# Patient Record
Sex: MALE | Race: WHITE | NOT HISPANIC OR LATINO | Employment: FULL TIME | URBAN - NONMETROPOLITAN AREA
[De-identification: names, ages, dates, MRNs, and addresses within clinical notes are randomized per-mention and may not be internally consistent; named-entity substitution may affect disease eponyms.]

---

## 2018-08-18 ENCOUNTER — APPOINTMENT (OUTPATIENT)
Dept: CT IMAGING | Facility: HOSPITAL | Age: 33
End: 2018-08-18

## 2018-08-18 ENCOUNTER — HOSPITAL ENCOUNTER (INPATIENT)
Facility: HOSPITAL | Age: 33
LOS: 3 days | Discharge: HOME | End: 2018-08-21
Attending: EMERGENCY MEDICINE | Admitting: HOSPITALIST

## 2018-08-18 DIAGNOSIS — B15.9 VIRAL HEPATITIS A WITHOUT HEPATIC COMA: Primary | ICD-10-CM

## 2018-08-18 PROBLEM — Z72.0 TOBACCO ABUSE: Status: ACTIVE | Noted: 2018-08-18

## 2018-08-18 LAB
6-ACETYL MORPHINE: NEGATIVE
ALBUMIN SERPL-MCNC: 3.9 G/DL (ref 3.5–5)
ALBUMIN/GLOB SERPL: 1.2 G/DL (ref 1.5–2.5)
ALP SERPL-CCNC: 268 U/L (ref 40–129)
ALT SERPL W P-5'-P-CCNC: 4210 U/L (ref 10–44)
AMMONIA BLD-SCNC: 40 UMOL/L (ref 16–60)
AMPHET+METHAMPHET UR QL: POSITIVE
ANION GAP SERPL CALCULATED.3IONS-SCNC: 5.3 MMOL/L (ref 3.6–11.2)
APTT PPP: 38 SECONDS (ref 23.8–36.1)
AST SERPL-CCNC: 3590 U/L (ref 10–34)
BACTERIA UR QL AUTO: ABNORMAL /HPF
BARBITURATES UR QL SCN: NEGATIVE
BENZODIAZ UR QL SCN: NEGATIVE
BILIRUB SERPL-MCNC: 5.4 MG/DL (ref 0.2–1.8)
BILIRUB UR QL STRIP: ABNORMAL
BUN BLD-MCNC: 12 MG/DL (ref 7–21)
BUN/CREAT SERPL: 18.5 (ref 7–25)
BUPRENORPHINE SERPL-MCNC: POSITIVE NG/ML
C TRACH RRNA CVX QL NAA+PROBE: NOT DETECTED
CALCIUM SPEC-SCNC: 9.2 MG/DL (ref 7.7–10)
CANNABINOIDS SERPL QL: POSITIVE
CHLORIDE SERPL-SCNC: 102 MMOL/L (ref 99–112)
CLARITY UR: CLEAR
CO2 SERPL-SCNC: 27.7 MMOL/L (ref 24.3–31.9)
COCAINE UR QL: NEGATIVE
COLOR UR: ABNORMAL
CREAT BLD-MCNC: 0.65 MG/DL (ref 0.43–1.29)
DEPRECATED RDW RBC AUTO: 41.1 FL (ref 37–54)
EOSINOPHIL # BLD MANUAL: 0.07 10*3/MM3 (ref 0–0.7)
EOSINOPHIL NFR BLD MANUAL: 2 % (ref 0–5)
ERYTHROCYTE [DISTWIDTH] IN BLOOD BY AUTOMATED COUNT: 13.5 % (ref 11.5–14.5)
GFR SERPL CREATININE-BSD FRML MDRD: 144 ML/MIN/1.73
GLOBULIN UR ELPH-MCNC: 3.2 GM/DL
GLUCOSE BLD-MCNC: 89 MG/DL (ref 70–110)
GLUCOSE UR STRIP-MCNC: NEGATIVE MG/DL
HAV IGM SERPL QL IA: REACTIVE
HBA1C MFR BLD: 5.3 % (ref 4.5–5.7)
HBV CORE IGM SERPL QL IA: ABNORMAL
HBV SURFACE AG SERPL QL IA: ABNORMAL
HCT VFR BLD AUTO: 43.6 % (ref 42–52)
HCV AB SER DONR QL: ABNORMAL
HGB BLD-MCNC: 15.3 G/DL (ref 14–18)
HGB UR QL STRIP.AUTO: NEGATIVE
HIV1+2 AB SER QL: NORMAL
HYALINE CASTS UR QL AUTO: ABNORMAL /LPF
INR PPP: 1.45 (ref 0.9–1.1)
KETONES UR QL STRIP: ABNORMAL
LEUKOCYTE ESTERASE UR QL STRIP.AUTO: ABNORMAL
LYMPHOCYTES # BLD MANUAL: 1.53 10*3/MM3 (ref 1–3)
LYMPHOCYTES NFR BLD MANUAL: 19 % (ref 0–10)
LYMPHOCYTES NFR BLD MANUAL: 45 % (ref 21–51)
MAGNESIUM SERPL-MCNC: 1.8 MG/DL (ref 1.7–2.6)
MCH RBC QN AUTO: 29.4 PG (ref 27–33)
MCHC RBC AUTO-ENTMCNC: 35.1 G/DL (ref 33–37)
MCV RBC AUTO: 83.8 FL (ref 80–94)
METHADONE UR QL SCN: NEGATIVE
MONOCYTES # BLD AUTO: 0.65 10*3/MM3 (ref 0.1–0.9)
N GONORRHOEA RRNA SPEC QL NAA+PROBE: NOT DETECTED
NEUTROPHILS # BLD AUTO: 1.16 10*3/MM3 (ref 1.4–6.5)
NEUTROPHILS NFR BLD MANUAL: 31 % (ref 30–70)
NEUTS BAND NFR BLD MANUAL: 3 % (ref 4–12)
NITRITE UR QL STRIP: POSITIVE
OPIATES UR QL: NEGATIVE
OSMOLALITY SERPL CALC.SUM OF ELEC: 269.3 MOSM/KG (ref 273–305)
OXYCODONE UR QL SCN: NEGATIVE
PCP UR QL SCN: NEGATIVE
PH UR STRIP.AUTO: 5.5 [PH] (ref 5–8)
PHOSPHATE SERPL-MCNC: 3.3 MG/DL (ref 2.7–4.5)
PLAT MORPH BLD: NORMAL
PLATELET # BLD AUTO: 177 10*3/MM3 (ref 130–400)
PMV BLD AUTO: 12.2 FL (ref 6–10)
POTASSIUM BLD-SCNC: 4.1 MMOL/L (ref 3.5–5.3)
PROT SERPL-MCNC: 7.1 G/DL (ref 6–8)
PROT UR QL STRIP: ABNORMAL
PROTHROMBIN TIME: 17.9 SECONDS (ref 11–15.4)
RBC # BLD AUTO: 5.2 10*6/MM3 (ref 4.7–6.1)
RBC # UR: ABNORMAL /HPF
RBC MORPH BLD: NORMAL
REF LAB TEST METHOD: ABNORMAL
SCAN SLIDE: NORMAL
SODIUM BLD-SCNC: 135 MMOL/L (ref 135–153)
SP GR UR STRIP: 1.02 (ref 1–1.03)
SQUAMOUS #/AREA URNS HPF: ABNORMAL /HPF
TSH SERPL DL<=0.05 MIU/L-ACNC: 0.5 MIU/ML (ref 0.55–4.78)
UROBILINOGEN UR QL STRIP: ABNORMAL
WBC NRBC COR # BLD: 3.4 10*3/MM3 (ref 4.5–12.5)
WBC UR QL AUTO: ABNORMAL /HPF

## 2018-08-18 PROCEDURE — 84439 ASSAY OF FREE THYROXINE: CPT | Performed by: PHYSICIAN ASSISTANT

## 2018-08-18 PROCEDURE — G0378 HOSPITAL OBSERVATION PER HR: HCPCS

## 2018-08-18 PROCEDURE — 80307 DRUG TEST PRSMV CHEM ANLYZR: CPT | Performed by: PHYSICIAN ASSISTANT

## 2018-08-18 PROCEDURE — G0432 EIA HIV-1/HIV-2 SCREEN: HCPCS | Performed by: PHYSICIAN ASSISTANT

## 2018-08-18 PROCEDURE — 93005 ELECTROCARDIOGRAM TRACING: CPT | Performed by: PHYSICIAN ASSISTANT

## 2018-08-18 PROCEDURE — 85610 PROTHROMBIN TIME: CPT | Performed by: PHYSICIAN ASSISTANT

## 2018-08-18 PROCEDURE — 99406 BEHAV CHNG SMOKING 3-10 MIN: CPT | Performed by: PHYSICIAN ASSISTANT

## 2018-08-18 PROCEDURE — 84100 ASSAY OF PHOSPHORUS: CPT | Performed by: PHYSICIAN ASSISTANT

## 2018-08-18 PROCEDURE — 85007 BL SMEAR W/DIFF WBC COUNT: CPT | Performed by: PHYSICIAN ASSISTANT

## 2018-08-18 PROCEDURE — 83036 HEMOGLOBIN GLYCOSYLATED A1C: CPT | Performed by: PHYSICIAN ASSISTANT

## 2018-08-18 PROCEDURE — 81001 URINALYSIS AUTO W/SCOPE: CPT | Performed by: PHYSICIAN ASSISTANT

## 2018-08-18 PROCEDURE — 83735 ASSAY OF MAGNESIUM: CPT | Performed by: PHYSICIAN ASSISTANT

## 2018-08-18 PROCEDURE — 99285 EMERGENCY DEPT VISIT HI MDM: CPT

## 2018-08-18 PROCEDURE — 82140 ASSAY OF AMMONIA: CPT | Performed by: PHYSICIAN ASSISTANT

## 2018-08-18 PROCEDURE — 25010000002 KETOROLAC TROMETHAMINE PER 15 MG: Performed by: PHYSICIAN ASSISTANT

## 2018-08-18 PROCEDURE — 74176 CT ABD & PELVIS W/O CONTRAST: CPT | Performed by: RADIOLOGY

## 2018-08-18 PROCEDURE — 85730 THROMBOPLASTIN TIME PARTIAL: CPT | Performed by: PHYSICIAN ASSISTANT

## 2018-08-18 PROCEDURE — 80053 COMPREHEN METABOLIC PANEL: CPT | Performed by: PHYSICIAN ASSISTANT

## 2018-08-18 PROCEDURE — 87591 N.GONORRHOEAE DNA AMP PROB: CPT | Performed by: PHYSICIAN ASSISTANT

## 2018-08-18 PROCEDURE — 84443 ASSAY THYROID STIM HORMONE: CPT | Performed by: PHYSICIAN ASSISTANT

## 2018-08-18 PROCEDURE — 25010000002 ONDANSETRON PER 1 MG: Performed by: PHYSICIAN ASSISTANT

## 2018-08-18 PROCEDURE — 99223 1ST HOSP IP/OBS HIGH 75: CPT | Performed by: PHYSICIAN ASSISTANT

## 2018-08-18 PROCEDURE — 74176 CT ABD & PELVIS W/O CONTRAST: CPT

## 2018-08-18 PROCEDURE — 94799 UNLISTED PULMONARY SVC/PX: CPT

## 2018-08-18 PROCEDURE — 87491 CHLMYD TRACH DNA AMP PROBE: CPT | Performed by: PHYSICIAN ASSISTANT

## 2018-08-18 PROCEDURE — 80074 ACUTE HEPATITIS PANEL: CPT | Performed by: PHYSICIAN ASSISTANT

## 2018-08-18 PROCEDURE — 85025 COMPLETE CBC W/AUTO DIFF WBC: CPT | Performed by: PHYSICIAN ASSISTANT

## 2018-08-18 RX ORDER — ALUMINA, MAGNESIA, AND SIMETHICONE 2400; 2400; 240 MG/30ML; MG/30ML; MG/30ML
SUSPENSION ORAL
Status: COMPLETED
Start: 2018-08-18 | End: 2018-08-18

## 2018-08-18 RX ORDER — KETOROLAC TROMETHAMINE 30 MG/ML
30 INJECTION, SOLUTION INTRAMUSCULAR; INTRAVENOUS ONCE
Status: COMPLETED | OUTPATIENT
Start: 2018-08-18 | End: 2018-08-18

## 2018-08-18 RX ORDER — SODIUM CHLORIDE 0.9 % (FLUSH) 0.9 %
10 SYRINGE (ML) INJECTION AS NEEDED
Status: DISCONTINUED | OUTPATIENT
Start: 2018-08-18 | End: 2018-08-21 | Stop reason: HOSPADM

## 2018-08-18 RX ORDER — ALUMINA, MAGNESIA, AND SIMETHICONE 2400; 2400; 240 MG/30ML; MG/30ML; MG/30ML
15 SUSPENSION ORAL EVERY 6 HOURS PRN
Status: DISCONTINUED | OUTPATIENT
Start: 2018-08-18 | End: 2018-08-21 | Stop reason: HOSPADM

## 2018-08-18 RX ORDER — ZOLPIDEM TARTRATE 5 MG/1
5 TABLET ORAL NIGHTLY PRN
Status: DISCONTINUED | OUTPATIENT
Start: 2018-08-18 | End: 2018-08-19

## 2018-08-18 RX ORDER — MULTIPLE VITAMINS W/ MINERALS TAB 9MG-400MCG
1 TAB ORAL DAILY
Status: DISCONTINUED | OUTPATIENT
Start: 2018-08-18 | End: 2018-08-21 | Stop reason: HOSPADM

## 2018-08-18 RX ORDER — MAGNESIUM SULFATE HEPTAHYDRATE 40 MG/ML
4 INJECTION, SOLUTION INTRAVENOUS ONCE
Status: COMPLETED | OUTPATIENT
Start: 2018-08-18 | End: 2018-08-18

## 2018-08-18 RX ORDER — IBUPROFEN 600 MG/1
600 TABLET ORAL EVERY 6 HOURS PRN
Status: DISCONTINUED | OUTPATIENT
Start: 2018-08-18 | End: 2018-08-21 | Stop reason: HOSPADM

## 2018-08-18 RX ORDER — MAGNESIUM SULFATE HEPTAHYDRATE 40 MG/ML
2 INJECTION, SOLUTION INTRAVENOUS AS NEEDED
Status: DISCONTINUED | OUTPATIENT
Start: 2018-08-18 | End: 2018-08-21 | Stop reason: HOSPADM

## 2018-08-18 RX ORDER — MAGNESIUM SULFATE HEPTAHYDRATE 40 MG/ML
4 INJECTION, SOLUTION INTRAVENOUS AS NEEDED
Status: DISCONTINUED | OUTPATIENT
Start: 2018-08-18 | End: 2018-08-21 | Stop reason: HOSPADM

## 2018-08-18 RX ORDER — SODIUM CHLORIDE 0.9 % (FLUSH) 0.9 %
1-10 SYRINGE (ML) INJECTION AS NEEDED
Status: DISCONTINUED | OUTPATIENT
Start: 2018-08-18 | End: 2018-08-21 | Stop reason: HOSPADM

## 2018-08-18 RX ORDER — SODIUM CHLORIDE 9 MG/ML
125 INJECTION, SOLUTION INTRAVENOUS CONTINUOUS
Status: DISCONTINUED | OUTPATIENT
Start: 2018-08-18 | End: 2018-08-21 | Stop reason: HOSPADM

## 2018-08-18 RX ORDER — ONDANSETRON 2 MG/ML
4 INJECTION INTRAMUSCULAR; INTRAVENOUS ONCE
Status: COMPLETED | OUTPATIENT
Start: 2018-08-18 | End: 2018-08-18

## 2018-08-18 RX ORDER — NICOTINE 21 MG/24HR
1 PATCH, TRANSDERMAL 24 HOURS TRANSDERMAL
Status: DISCONTINUED | OUTPATIENT
Start: 2018-08-18 | End: 2018-08-21 | Stop reason: HOSPADM

## 2018-08-18 RX ADMIN — MAGNESIUM SULFATE IN WATER 4 G: 40 INJECTION, SOLUTION INTRAVENOUS at 17:33

## 2018-08-18 RX ADMIN — ALUMINUM HYDROXIDE, MAGNESIUM HYDROXIDE, AND DIMETHICONE 15 ML: 400; 400; 40 SUSPENSION ORAL at 13:48

## 2018-08-18 RX ADMIN — ZOLPIDEM TARTRATE 5 MG: 5 TABLET ORAL at 23:29

## 2018-08-18 RX ADMIN — Medication 1 TABLET: at 14:37

## 2018-08-18 RX ADMIN — IBUPROFEN 600 MG: 600 TABLET ORAL at 15:34

## 2018-08-18 RX ADMIN — NICOTINE 1 PATCH: 21 PATCH TRANSDERMAL at 15:34

## 2018-08-18 RX ADMIN — LIDOCAINE HYDROCHLORIDE 5 ML: 20 SOLUTION ORAL; TOPICAL at 16:30

## 2018-08-18 RX ADMIN — SODIUM CHLORIDE 100 ML/HR: 9 INJECTION, SOLUTION INTRAVENOUS at 14:37

## 2018-08-18 RX ADMIN — ONDANSETRON 4 MG: 2 INJECTION, SOLUTION INTRAMUSCULAR; INTRAVENOUS at 08:32

## 2018-08-18 RX ADMIN — LIDOCAINE HYDROCHLORIDE 5 ML: 20 SOLUTION ORAL; TOPICAL at 21:57

## 2018-08-18 RX ADMIN — KETOROLAC TROMETHAMINE 30 MG: 30 INJECTION, SOLUTION INTRAMUSCULAR; INTRAVENOUS at 09:55

## 2018-08-18 RX ADMIN — SODIUM CHLORIDE 1000 ML: 9 INJECTION, SOLUTION INTRAVENOUS at 08:32

## 2018-08-18 RX ADMIN — LIDOCAINE HYDROCHLORIDE 5 ML: 20 SOLUTION ORAL; TOPICAL at 11:39

## 2018-08-18 NOTE — H&P
"     [facility] HOSPITALIST HISTORY AND PHYSICAL    Patient Identification:  Name:  Chuy Sandhu  Age:  32 y.o.  Sex:  male  :  1985  MRN:  [id number]   Visit Number:  [id number]  Primary Care Physician:  Carmen Piedra PA-C     Subjective     Chief complaint:   Chief Complaint   Patient presents with   • Abdominal Pain   • Dysuria     History of presenting illness:   Patient is a 32 y.o. male with no significant past medical history that presented to the [facility] emergency department for evaluation of abdominal pain. Patient states that for approximately two weeks he has had intermittent fever, T max 102) and headaches. Patient states that over the past couple of days he has developed lower abdominal pain for which he locates in the suprapubic region. Patient states that over the past three days he has also developed nausea and emesis. Patient denies diarrhea, but states that he had constipation. He states that he took stool softeners with relief of his constipation, however his abdominal discomfort resumed regardless. Patient also reports dark cloudy urine with foul odor, he also admits to dysuria and urinary frequency. He denies any history of STDs. His girlfriend whom is pregnant is present at bedside, reports monogamous relationship. Patient reports that he thought it might have been a stomach bug, so he treated himself with ibuprofen. But due to no symptomatic improvement and worsening abdominal pain, he came to ED for evaluation. Patient denies any ill contacts. He works at [employer] in [facility]. He denies any IVDA or substance abuse. He denies any home medications, however ABDIAZIZ reports regular prescribing of Suboxone, last filled 2018. I asked patient and he reports he was \"recently\" started on the medication but has not been taking it and last dose was approximately two weeks ago. His UDS is positive for suboxone as well as amphetamines and THC, " he denies any amphetamines but does use THC recreationally. He denies any chest pain or shortness of breath. His abdominal pain resumes but slightly improved since time of arrival to ED.     Upon arrival to the ED, vitals were temperature 98.7, HR 87, RR 16, /74, and oxygen saturation 98% on room air.  CMP with alkaline phosphatase 268, PLT 4210, AST 3590, and bilirubin total 5.4.  CBC with WBC 3.40, otherwise unremarkable.  Urinalysis with nelson color, 3+ ketones, positive nitrite, trace leukocyte esterase, 1+ protein, 3+ bilirubin, 3-5 RBC, 0 WBC, and no bacteria noted.  Urine drug screen positive for amphetamines, Suboxone, and THC.  Chlamydia and gonorrhea negative.  Hepatitis panel positive for hepatitis A IgM.  INR elevated at 1.45 and APTT 38.0.  While in the emergency department, patient was given a dose of 30 mg IV Toradol, 4 mg IV Zofran, and 5 mL viscous lidocaine 2%.  Patient was also given a 1 L bolus normal saline.    Patient has been placed in observation status on the medical surgical floor for further evaluation and treatment.   ---------------------------------------------------------------------------------------------------------------------   Review of Systems   Constitutional: Positive for activity change, appetite change, fatigue and fever. Negative for chills and diaphoresis.   HENT: Negative for congestion, postnasal drip, rhinorrhea, sinus pain, sore throat and trouble swallowing.    Eyes: Negative for discharge and visual disturbance.   Respiratory: Negative for cough, chest tightness, shortness of breath and wheezing.    Cardiovascular: Negative for chest pain, palpitations and leg swelling.   Gastrointestinal: Positive for abdominal pain, constipation, nausea and vomiting. Negative for diarrhea.   Endocrine: Negative for cold intolerance and heat intolerance.   Genitourinary: Positive for dysuria, frequency and urgency. Negative for decreased urine volume, difficulty urinating,  discharge, enuresis, flank pain, genital sores, hematuria, scrotal swelling and testicular pain.   Musculoskeletal: Negative for arthralgias, gait problem and myalgias.   Skin: Negative for color change, rash and wound.   Allergic/Immunologic: Negative for environmental allergies and immunocompromised state.   Neurological: Negative for dizziness, syncope, weakness, light-headedness and headaches.   Hematological: Negative for adenopathy. Does not bruise/bleed easily.   Psychiatric/Behavioral: Negative for confusion and decreased concentration. The patient is not nervous/anxious.       ---------------------------------------------------------------------------------------------------------------------   History reviewed. No pertinent past medical history.  History reviewed. No pertinent surgical history.  Family History   Problem Relation Age of Onset   • Diabetes Father      Social History     Social History   • Marital status: Single     Social History Main Topics   • Smoking status: Current Every Day Smoker     Packs/day: 1.00     Types: Cigarettes   • Alcohol use No   • Drug use: No   • Sexual activity: Defer     Other Topics Concern   • Not on file     ---------------------------------------------------------------------------------------------------------------------   Allergies:  Patient has no known allergies.  ---------------------------------------------------------------------------------------------------------------------   Prior to Admission Medications     None        ---------------------------------------------------------------------------------------------------------------------    Objective     Hospital Scheduled Meds:    multivitamin with minerals 1 tablet Oral Daily   nicotine 1 patch Transdermal Q24H       sodium chloride 100 mL/hr Last Rate: 100 mL/hr (08/18/18 1437)     ---------------------------------------------------------------------------------------------------------------------    Vital Signs:  Temp:  [97.5 °F (36.4 °C)-98.7 °F (37.1 °C)] 97.5 °F (36.4 °C)  Heart Rate:  [72-87] 72  Resp:  [16-18] 18  BP: (111-135)/(67-85) 111/70  Mean Arterial Pressure (Non-Invasive) for the past 24 hrs (Last 3 readings):   Noninvasive MAP (mmHg)   08/18/18 1202 99   08/18/18 1132 95   08/18/18 1102 92     SpO2 Percentage    08/18/18 1202 08/18/18 1317 08/18/18 1453   SpO2: 98% 98% 99%     SpO2:  [98 %-99 %] 99 %  on   ;        Body mass index is 23.75 kg/m².  Wt Readings from Last 3 Encounters:   08/18/18 81.6 kg (180 lb)   10/04/16 81.6 kg (180 lb)     ---------------------------------------------------------------------------------------------------------------------   Physical Exam:  Constitutional:  Well-developed and well-nourished.  No respiratory distress.  Ill appearing.  Nontoxic.  T.     HENT:  Head: Normocephalic and atraumatic.  Mouth:  Moist mucous membranes.    Eyes:  Conjunctivae and EOM are normal.  Pupils are equal, round, and reactive to light.  No scleral icterus.  Neck:  Neck supple.  No JVD present.    Cardiovascular:  Normal rate, regular rhythm and normal heart sounds with no murmur.  Pulmonary/Chest:  No respiratory distress, no wheezes, no crackles, with normal breath sounds and good air movement.  Abdominal:  Soft.  Bowel sounds are normal.  No distension.  Suprapubic tenderness, otherwise no tenderness noted.  There is no rebound or guarding.    Musculoskeletal:  No edema, no tenderness, and no deformity.  No red or swollen joints anywhere.    Neurological:  Alert and oriented to person, place, and time.  No cranial nerve deficit.  No tongue deviation.  No facial droop.  No slurred speech.   Skin:  Skin is warm and dry.  No rash noted.  No pallor.  Nonicteric.    Psychiatric:  Normal mood and affect.  Behavior is normal.  Judgment and thought content normal.   Peripheral vascular:  No edema and strong pulses on all 4 extremities. Cap refill < 3 seconds.  Genitourinary: No borja  catheter in place, making urine.   ---------------------------------------------------------------------------------------------------------------------  EKG:    Ordered and pending    Telemetry:    Patient is not currently on telemetry monitoring.    I have personally reviewed the EKG/Telemetry strip  ---------------------------------------------------------------------------------------------------------------------               Results from last 7 days  Lab Units 08/18/18  1207 08/18/18  0828   WBC 10*3/mm3  --  3.40*   HEMOGLOBIN g/dL  --  15.3   HEMATOCRIT %  --  43.6   MCV fL  --  83.8   MCHC g/dL  --  35.1   PLATELETS 10*3/mm3  --  177   INR  1.45*  --        Results from last 7 days  Lab Units 08/18/18  0828   SODIUM mmol/L 135   POTASSIUM mmol/L 4.1   MAGNESIUM mg/dL 1.8   CHLORIDE mmol/L 102   CO2 mmol/L 27.7   BUN mg/dL 12   CREATININE mg/dL 0.65   EGFR IF NONAFRICN AM mL/min/1.73 144   CALCIUM mg/dL 9.2   GLUCOSE mg/dL 89   ALBUMIN g/dL 3.90   BILIRUBIN mg/dL 5.4*   ALK PHOS U/L 268*   AST (SGOT) U/L 3,590*   ALT (SGPT) U/L 4,210*   Estimated Creatinine Clearance: 191.8 mL/min (by C-G formula based on SCr of 0.65 mg/dL).  No results found for: AMMONIA    Hemoglobin A1C   Date/Time Value Ref Range Status   08/18/2018 0828 5.30 4.50 - 5.70 % Final     Lab Results   Component Value Date    HGBA1C 5.30 08/18/2018     No results found for: TSH, FREET4                        I have personally reviewed the above laboratory results.   ---------------------------------------------------------------------------------------------------------------------  Imaging Results (last 7 days)     Procedure Component Value Units Date/Time    CT Abdomen Pelvis Stone Protocol [[id number]] Collected:  08/18/18 0943     Updated:  08/18/18 0947    Narrative:       Findings  LOWER THORAX: Clear. No effusions.  ABDOMEN:     LIVER: SOME PERIPORTAL EDEMA AROUND THE LIVER.     GALLBLADDER: NONSPECIFIC DENSITY WITHIN THE GALLBLADDER  THAT MAY  REPRESENT SLUDGE.     PANCREAS: Unremarkable. No mass or ductal dilatation.     SPLEEN: Homogeneous. No splenomegaly.     ADRENALS: No mass.     KIDNEYS: No mass. No obstructive uropathy.  No evidence of  urolithiasis.     GI TRACT: Non-dilated. No definite wall thickening.     PERITONEUM: TINY ASCITES OR FLUID WITHIN THE PELVIS.     MESENTERY: Unremarkable.     LYMPH NODES: No lymphadenopathy.     VASCULATURE: No evidence of aneurysm.     ABDOMINAL WALL: No focal hernia or mass.     OTHER: None.  PELVIS:     BLADDER: No focal mass or significant wall thickening     REPRODUCTIVE: Unremarkable as visualized.     APPENDIX: Nondistended. No surrounding inflammation.  BONES: No acute bony abnormality.    Impression:       Impression:  Some periportal edema and tiny ascites within the pelvis with associated  gallbladder wall thickening that could represent hepatitis or  potentially gallbladder disease.     This report was finalized on 8/18/2018 9:45 AM by Dr. Louis Crisostomo MD.      I have personally reviewed the above radiology results.   ---------------------------------------------------------------------------------------------------------------------    Assessment & Plan      -Acute viral hepatitis A   -Hyperbilirubinemia   -Periportal edema with tiny ascites  -Gallbladder wall thickening likely cholestasis in setting of hepatitis  -Mild leukopenia   -Urine drug screen positive for amphetamines, Suboxone, and THC  -Tobacco abuse     Patient has been placed in observation status on the medical surgical floor for further evaluation and treatment.  Continue supportive care for acute hepatitis A treatment.  Continue gentle IV fluid hydration, will let the patient have clear liquid diet and advance as tolerated.  As needed anti-emetics available.  Follow temperature curve for any fevers, will treat with ibuprofen/NSAIDs as needed.  Avoid any further hepatotoxic agents.  Closely monitor alkaline phosphatase and  transaminases as well as bilirubin level.  Patient does have gallbladder wall thickening on CT scan with possible appearance of sludge, this is likely cholestasis in setting of acute hepatitis.  Patient had benign gallbladder exam.  Will hold off on further workup of gallbladder at this time as patient's symptoms most likely due to acute hepatitis.    Patient educated on transmission of hepatitis A virus.  Patient educated on good hand hygiene.  Recommend he contact anyone that he has had close contact with her cook for to get the hepatitis A vaccine.  I did contact her laboratory in house, all hepatitis A cases are being reported to state for close monitoring.  Once LFTs trending down, consider giving patient hepatitis A vaccine.  Patient's girlfriend present at bedside who is pregnant, recommended to see OB/GYN for possible vaccination/post exposure prophylaxis.    Patient's urine to be sent for culture, follow for final results.  Hold off on antibiotic therapy for now as patient is afebrile, white blood cell count is within normal limits, and as no WBC or bacteria noted on urinalysis.    Tobacco Cessation Counselin minutes of tobacco cessation counseling provided, including but not limited to, risks of ongoing tobacco use, pertinence to current or future health status and availability/examples of cessation resources.  Patient expresses desire to quit.  Nicotine patch ordered.    Patient denies any history of drug use or active drug use other than THC.  He does state he was recently prescribed Suboxone, however he only tried it a few times and last dose was approximately 2 weeks ago.  I asked the patient he was on Suboxone therapy for history of drug abuse, he adamantly reports that he has no history.  Jacky report was reviewed, patient has been routinely getting Suboxone prescriptions by Dr. huddleston in [facility] since 2017.  I will not order this medication during this admission as patient  states he does not take it, however his urine drug screen is positive.  Patient denies any amphetamine usage, he states he does not know how his urine drug screen is positive for this.  He has not had any track marks on exam.  I will go ahead and send an HIV testing.    Continue to closely monitor electrolytes and replace per protocol as necessary. Will review patient's home medications once reconciled per pharmacy and resume as appropriate. Will repeat labs in AM and continue to closely monitor the patient.       DVT Prophylaxis: SQ heparin   Activity: Ad brian     The patient is considered to be a high risk patient due to: Hepatitis A, positive urine drug screen, tobacco abuse, periportal edema    OBSERVATION status, however if further evaluation or treatment plans warrant, status may change.  Based upon current information, I predict patient's care encounter to be less than or equal to 2 midnights.    Code Status: FULL CODE    I have discussed the patient's assessment and plan with the patient and patient's daughter present at bedside.     CELESTINE Jim  Hospitalist Service  08/18/18  3:03 PM  ---------------------------------------------------------------------------------------------------------------------   Physician Attestation: I have seen and examined the patient. I agree with the assessment and plan of Daivna Gutierrez PA-C. I have amended the above note to reflect my findings in history, physical examination, decision making and management.  plan.

## 2018-08-18 NOTE — PLAN OF CARE
Problem: Patient Care Overview  Goal: Plan of Care Review  Outcome: Ongoing (interventions implemented as appropriate)    Goal: Discharge Needs Assessment  Outcome: Ongoing (interventions implemented as appropriate)   08/18/18 1343   Discharge Needs Assessment   Readmission Within the Last 30 Days no previous admission in last 30 days   Concerns to be Addressed no discharge needs identified   Patient/Family Anticipates Transition to home   Patient/Family Anticipated Services at Transition none   Transportation Concerns car, none   Transportation Anticipated car, drives self   Anticipated Changes Related to Illness none   Equipment Needed After Discharge none   Disability   Equipment Currently Used at Home none       Problem: Pain, Acute (Adult)  Goal: Identify Related Risk Factors and Signs and Symptoms  Outcome: Ongoing (interventions implemented as appropriate)   08/18/18 1343   Pain, Acute (Adult)   Related Risk Factors (Acute Pain) persistent pain     Goal: Acceptable Pain Control/Comfort Level  Outcome: Ongoing (interventions implemented as appropriate)

## 2018-08-18 NOTE — ED PROVIDER NOTES
Subjective   32-year-old white male presents secondary to lower abdominal discomfort with burning on urination.  He denies any discharge.  No testicular pain.  Sates that he had discomfort over the past 5 days that worse since yesterday.  He reports having nausea though no vomiting.  He previously had a headache.  He states he had a fever up to 102 a few days ago.  He denies any history of STD.  He denies any questionable encounters.  He voices no other complaints.            Review of Systems   Constitutional: Negative.  Negative for fever.   HENT: Negative.    Respiratory: Negative.    Cardiovascular: Negative.  Negative for chest pain.   Gastrointestinal: Positive for abdominal pain and nausea. Negative for diarrhea and vomiting.   Endocrine: Negative.    Genitourinary: Positive for difficulty urinating, dysuria and frequency. Negative for penile pain, penile swelling, scrotal swelling and urgency.   Skin: Negative.    Neurological: Negative.    Psychiatric/Behavioral: Negative.    All other systems reviewed and are negative.      History reviewed. No pertinent past medical history.    No Known Allergies    History reviewed. No pertinent surgical history.    History reviewed. No pertinent family history.    Social History     Social History   • Marital status: Single     Social History Main Topics   • Smoking status: Current Every Day Smoker     Packs/day: 1.00     Types: Cigarettes   • Alcohol use No   • Drug use: No   • Sexual activity: Defer     Other Topics Concern   • Not on file           Objective   Physical Exam   Constitutional: He is oriented to person, place, and time. He appears well-developed and well-nourished. No distress.   HENT:   Head: Normocephalic and atraumatic.   Right Ear: External ear normal.   Left Ear: External ear normal.   Nose: Nose normal.   Eyes: Pupils are equal, round, and reactive to light. Conjunctivae and EOM are normal.   Neck: Normal range of motion. Neck supple. No JVD  present. No tracheal deviation present.   Cardiovascular: Normal rate, regular rhythm and normal heart sounds.    No murmur heard.  Pulmonary/Chest: Effort normal and breath sounds normal. No respiratory distress. He has no wheezes.   Abdominal: Soft. Bowel sounds are normal. There is tenderness (suprpubic region. ). There is no rebound and no guarding.   Musculoskeletal: Normal range of motion. He exhibits no edema or deformity.   Neurological: He is alert and oriented to person, place, and time. No cranial nerve deficit.   Skin: Skin is warm and dry. No rash noted. He is not diaphoretic. No erythema. No pallor.   Psychiatric: He has a normal mood and affect. His behavior is normal. Thought content normal.   Nursing note and vitals reviewed.      Procedures           ED Course  ED Course as of Aug 18 1353   Sat Aug 18, 2018   0957 Patient counseled about his elevated liver enzymes.  Denies any IV drug use at all.  He states that he used to be on Suboxone.  He is not sure how his methamphetamine screen is positive.  He denies any upper abdominal discomfort.  He states this pain is mostly than lower.  He voices no other complaints at this time.  [JI]   1348 Previously d/w Dr Burgos- admit. D/w Dr Gaona- obs admit.  [JI]      ED Course User Index  [JI] Clyde Aguillon PA                  [facility]  Number of Diagnoses or Management Options  Viral hepatitis A without hepatic coma: new and requires workup     Amount and/or Complexity of Data Reviewed  Clinical lab tests: reviewed and ordered  Tests in the radiology section of CPT®: reviewed and ordered  Discuss the patient with other providers: yes    Risk of Complications, Morbidity, and/or Mortality  Presenting problems: moderate          Final diagnoses:   Viral hepatitis A without hepatic coma            Clyde Aguillon PA  08/18/18 7071

## 2018-08-19 PROBLEM — B15.9 VIRAL HEPATITIS A WITHOUT HEPATIC COMA: Status: ACTIVE | Noted: 2018-08-19

## 2018-08-19 LAB
ALBUMIN SERPL-MCNC: 3.1 G/DL (ref 3.5–5)
ALBUMIN/GLOB SERPL: 1.1 G/DL (ref 1.5–2.5)
ALP SERPL-CCNC: 209 U/L (ref 40–129)
ALT SERPL W P-5'-P-CCNC: 4155 U/L (ref 10–44)
ANION GAP SERPL CALCULATED.3IONS-SCNC: 3.9 MMOL/L (ref 3.6–11.2)
APTT PPP: 37.2 SECONDS (ref 23.8–36.1)
AST SERPL-CCNC: 3409 U/L (ref 10–34)
BILIRUB SERPL-MCNC: 5.8 MG/DL (ref 0.2–1.8)
BUN BLD-MCNC: 7 MG/DL (ref 7–21)
BUN/CREAT SERPL: 10.8 (ref 7–25)
CALCIUM SPEC-SCNC: 8.1 MG/DL (ref 7.7–10)
CHLORIDE SERPL-SCNC: 106 MMOL/L (ref 99–112)
CO2 SERPL-SCNC: 26.1 MMOL/L (ref 24.3–31.9)
CREAT BLD-MCNC: 0.65 MG/DL (ref 0.43–1.29)
DEPRECATED RDW RBC AUTO: 46.8 FL (ref 37–54)
EOSINOPHIL # BLD MANUAL: 0.08 10*3/MM3 (ref 0–0.7)
EOSINOPHIL NFR BLD MANUAL: 3 % (ref 0–5)
ERYTHROCYTE [DISTWIDTH] IN BLOOD BY AUTOMATED COUNT: 14.4 % (ref 11.5–14.5)
GFR SERPL CREATININE-BSD FRML MDRD: 144 ML/MIN/1.73
GLOBULIN UR ELPH-MCNC: 2.9 GM/DL
GLUCOSE BLD-MCNC: 91 MG/DL (ref 70–110)
HCT VFR BLD AUTO: 44 % (ref 42–52)
HGB BLD-MCNC: 14.6 G/DL (ref 14–18)
INR PPP: 1.66 (ref 0.9–1.1)
LYMPHOCYTES # BLD MANUAL: 1.08 10*3/MM3 (ref 1–3)
LYMPHOCYTES NFR BLD MANUAL: 21 % (ref 0–10)
LYMPHOCYTES NFR BLD MANUAL: 39 % (ref 21–51)
MAGNESIUM SERPL-MCNC: 2.5 MG/DL (ref 1.7–2.6)
MCH RBC QN AUTO: 29.7 PG (ref 27–33)
MCHC RBC AUTO-ENTMCNC: 33.2 G/DL (ref 33–37)
MCV RBC AUTO: 89.6 FL (ref 80–94)
MONOCYTES # BLD AUTO: 0.58 10*3/MM3 (ref 0.1–0.9)
NEUTROPHILS # BLD AUTO: 1.02 10*3/MM3 (ref 1.4–6.5)
NEUTROPHILS NFR BLD MANUAL: 36 % (ref 30–70)
NEUTS BAND NFR BLD MANUAL: 1 % (ref 4–12)
OSMOLALITY SERPL CALC.SUM OF ELEC: 269.5 MOSM/KG (ref 273–305)
PHOSPHATE SERPL-MCNC: 2.7 MG/DL (ref 2.7–4.5)
PLAT MORPH BLD: NORMAL
PLATELET # BLD AUTO: 126 10*3/MM3 (ref 130–400)
PMV BLD AUTO: 12.2 FL (ref 6–10)
POTASSIUM BLD-SCNC: 4.2 MMOL/L (ref 3.5–5.3)
PROT SERPL-MCNC: 6 G/DL (ref 6–8)
PROTHROMBIN TIME: 19.8 SECONDS (ref 11–15.4)
RBC # BLD AUTO: 4.91 10*6/MM3 (ref 4.7–6.1)
SCAN SLIDE: NORMAL
SODIUM BLD-SCNC: 136 MMOL/L (ref 135–153)
STOMATOCYTES BLD QL SMEAR: ABNORMAL
T4 FREE SERPL-MCNC: 1.21 NG/DL (ref 0.89–1.76)
TARGETS BLD QL SMEAR: ABNORMAL
WBC NRBC COR # BLD: 2.77 10*3/MM3 (ref 4.5–12.5)

## 2018-08-19 PROCEDURE — 80053 COMPREHEN METABOLIC PANEL: CPT | Performed by: PHYSICIAN ASSISTANT

## 2018-08-19 PROCEDURE — 85025 COMPLETE CBC W/AUTO DIFF WBC: CPT | Performed by: PHYSICIAN ASSISTANT

## 2018-08-19 PROCEDURE — 87040 BLOOD CULTURE FOR BACTERIA: CPT | Performed by: INTERNAL MEDICINE

## 2018-08-19 PROCEDURE — 85610 PROTHROMBIN TIME: CPT | Performed by: PHYSICIAN ASSISTANT

## 2018-08-19 PROCEDURE — 25010000002 DICYCLOMINE PER 20 MG: Performed by: INTERNAL MEDICINE

## 2018-08-19 PROCEDURE — 85007 BL SMEAR W/DIFF WBC COUNT: CPT | Performed by: PHYSICIAN ASSISTANT

## 2018-08-19 PROCEDURE — 83735 ASSAY OF MAGNESIUM: CPT | Performed by: PHYSICIAN ASSISTANT

## 2018-08-19 PROCEDURE — 99232 SBSQ HOSP IP/OBS MODERATE 35: CPT | Performed by: INTERNAL MEDICINE

## 2018-08-19 PROCEDURE — 99253 IP/OBS CNSLTJ NEW/EST LOW 45: CPT | Performed by: SURGERY

## 2018-08-19 PROCEDURE — 25010000002 ONDANSETRON PER 1 MG: Performed by: PHYSICIAN ASSISTANT

## 2018-08-19 PROCEDURE — 85730 THROMBOPLASTIN TIME PARTIAL: CPT | Performed by: PHYSICIAN ASSISTANT

## 2018-08-19 PROCEDURE — 84100 ASSAY OF PHOSPHORUS: CPT | Performed by: PHYSICIAN ASSISTANT

## 2018-08-19 RX ORDER — HYDROXYZINE HYDROCHLORIDE 25 MG/1
50 TABLET, FILM COATED ORAL NIGHTLY PRN
Status: DISCONTINUED | OUTPATIENT
Start: 2018-08-19 | End: 2018-08-21 | Stop reason: HOSPADM

## 2018-08-19 RX ORDER — DOCUSATE SODIUM 100 MG/1
100 CAPSULE, LIQUID FILLED ORAL 2 TIMES DAILY
Status: DISCONTINUED | OUTPATIENT
Start: 2018-08-19 | End: 2018-08-21 | Stop reason: HOSPADM

## 2018-08-19 RX ORDER — DICYCLOMINE HYDROCHLORIDE 10 MG/ML
20 INJECTION INTRAMUSCULAR 4 TIMES DAILY
Status: DISCONTINUED | OUTPATIENT
Start: 2018-08-19 | End: 2018-08-20

## 2018-08-19 RX ORDER — ONDANSETRON 2 MG/ML
4 INJECTION INTRAMUSCULAR; INTRAVENOUS ONCE
Status: COMPLETED | OUTPATIENT
Start: 2018-08-19 | End: 2018-08-19

## 2018-08-19 RX ADMIN — LIDOCAINE HYDROCHLORIDE 5 ML: 20 SOLUTION ORAL; TOPICAL at 12:06

## 2018-08-19 RX ADMIN — SODIUM CHLORIDE 150 ML/HR: 9 INJECTION, SOLUTION INTRAVENOUS at 16:57

## 2018-08-19 RX ADMIN — HYDROXYZINE HYDROCHLORIDE 50 MG: 25 TABLET ORAL at 23:06

## 2018-08-19 RX ADMIN — ALUMINUM HYDROXIDE, MAGNESIUM HYDROXIDE, AND DIMETHICONE 15 ML: 400; 400; 40 SUSPENSION ORAL at 08:18

## 2018-08-19 RX ADMIN — NICOTINE 1 PATCH: 21 PATCH TRANSDERMAL at 08:13

## 2018-08-19 RX ADMIN — SODIUM CHLORIDE 100 ML/HR: 9 INJECTION, SOLUTION INTRAVENOUS at 08:13

## 2018-08-19 RX ADMIN — DICYCLOMINE HYDROCHLORIDE 20 MG: 20 INJECTION, SOLUTION INTRAMUSCULAR at 21:33

## 2018-08-19 RX ADMIN — DOCUSATE SODIUM 100 MG: 100 CAPSULE, LIQUID FILLED ORAL at 20:04

## 2018-08-19 RX ADMIN — LIDOCAINE HYDROCHLORIDE 5 ML: 20 SOLUTION ORAL; TOPICAL at 21:33

## 2018-08-19 RX ADMIN — IBUPROFEN 600 MG: 600 TABLET ORAL at 03:19

## 2018-08-19 RX ADMIN — ONDANSETRON 4 MG: 2 INJECTION, SOLUTION INTRAMUSCULAR; INTRAVENOUS at 19:05

## 2018-08-19 RX ADMIN — DICYCLOMINE HYDROCHLORIDE 20 MG: 20 INJECTION, SOLUTION INTRAMUSCULAR at 17:22

## 2018-08-19 RX ADMIN — LIDOCAINE HYDROCHLORIDE 5 ML: 20 SOLUTION ORAL; TOPICAL at 03:19

## 2018-08-19 RX ADMIN — Medication 1 TABLET: at 08:13

## 2018-08-19 RX ADMIN — IBUPROFEN 600 MG: 600 TABLET ORAL at 09:20

## 2018-08-19 NOTE — CONSULTS
Inpatient General Surgery Consult  Consult performed by: WILLIE JIMÉNEZ  Consult ordered by: DANIELE SANTAMARIA  Reason for consult: Gallbladder disease  Assessment/Recommendations: Gallbladder inflammatory changes likely secondary to hepatitis A    Recommended outpatient follow-up after hepatitis has resolved          Patient Care Team:  Carmen Piedra PA-C as PCP - General (Physician Assistant)    Chief complaint:  Hepatitis A    Subjective     32-year-old male admitted with acute hepatitis A.  CT shows periportal edema and thickening of the gallbladder wall concerning for possible cholecystitis.  He has markedly elevated transaminases and bilirubin and his coags are elevated with an INR of 1.66 indicating some element of liver dysfunction secondary to his hepatitis.  No previous abdominal surgeries reported.  No intermittent abdominal pain with greasy meals or fatty meals prior to presentation with this acute episode of hepatitis A.      Abdominal Pain   Associated symptoms include dysuria. Pertinent negatives include no constipation, diarrhea, fever, headaches, nausea or vomiting.   Dysuria    Pertinent negatives include no chills, nausea or vomiting.       Review of Systems   Constitutional: Negative for activity change, appetite change, chills and fever.   HENT: Negative for sore throat and trouble swallowing.    Eyes: Negative for visual disturbance.   Respiratory: Negative for cough and shortness of breath.    Cardiovascular: Negative for chest pain and palpitations.   Gastrointestinal: Positive for abdominal pain. Negative for abdominal distention, blood in stool, constipation, diarrhea, nausea and vomiting.   Endocrine: Negative for cold intolerance and heat intolerance.   Genitourinary: Positive for dysuria.   Musculoskeletal: Negative for joint swelling.   Skin: Negative for color change, rash and wound.   Allergic/Immunologic: Negative for immunocompromised state.   Neurological: Negative for  dizziness, seizures, weakness and headaches.   Hematological: Negative for adenopathy. Does not bruise/bleed easily.   Psychiatric/Behavioral: Negative for agitation and confusion.        History reviewed. No pertinent past medical history., History reviewed. No pertinent surgical history.,   Family History   Problem Relation Age of Onset   • Diabetes Father    ,   Social History   Substance Use Topics   • Smoking status: Current Every Day Smoker     Packs/day: 1.00     Types: Cigarettes   • Smokeless tobacco: Not on file   • Alcohol use No   ,   No prescriptions prior to admission.   , Scheduled Meds:    dicyclomine 20 mg Intramuscular 4x Daily   docusate sodium 100 mg Oral BID   multivitamin with minerals 1 tablet Oral Daily   nicotine 1 patch Transdermal Q24H   , Continuous Infusions:    sodium chloride 150 mL/hr Last Rate: 150 mL/hr (08/19/18 1657)   , PRN Meds:  •  aluminum-magnesium hydroxide-simethicone  •  hydrOXYzine  •  ibuprofen  •  lidocaine viscous  •  magnesium sulfate **OR** magnesium sulfate **OR** magnesium sulfate  •  sodium chloride  •  Insert peripheral IV **AND** sodium chloride and Allergies:  Patient has no known allergies.    Objective      Vital Signs  Temp:  [97.8 °F (36.6 °C)-98.8 °F (37.1 °C)] 98.8 °F (37.1 °C)  Heart Rate:  [63-73] 73  Resp:  [16-20] 16  BP: ()/(56-73) 114/73    Physical Exam   Constitutional: He is oriented to person, place, and time. He appears well-developed.   HENT:   Head: Normocephalic and atraumatic.   Mouth/Throat: Mucous membranes are normal.   Eyes: Pupils are equal, round, and reactive to light. Conjunctivae are normal. Scleral icterus is present.   Neck: Neck supple. No JVD present. No tracheal deviation present. No thyromegaly present.   Cardiovascular: Normal rate and regular rhythm.  Exam reveals no gallop and no friction rub.    No murmur heard.  Pulmonary/Chest: Effort normal and breath sounds normal.   Abdominal: Soft. He exhibits no distension.  There is no splenomegaly or hepatomegaly. There is tenderness in the right upper quadrant and epigastric area. No hernia.   Musculoskeletal: Normal range of motion. He exhibits no deformity.   Neurological: He is alert and oriented to person, place, and time.   Skin: Skin is warm and dry.   Psychiatric: He has a normal mood and affect.       Results Review:    I reviewed the patient's new clinical results.        Assessment/Plan     Active Problems:    Hepatitis A    Tobacco abuse    Viral hepatitis A without hepatic coma      Assessment:  (32-year-old male with acute hepatitis A and concern for possible concurrent gallbladder disease.).     Plan:   (Gallbladder inflammatory changes likely secondary to active hepatitis a    Recommend outpatient follow-up with ultrasound to determine if cholecystectomy is indicated after hepatitis has resolved.).       I discussed the patients findings and my recommendations with patient    Ricardo Rodriguez MD  08/19/18  5:41 PM

## 2018-08-19 NOTE — PROGRESS NOTES
"    [facility] Medicine Services  INPATIENT PROGRESS NOTE    Length of Stay: 0  Date of Admission: 8/18/2018  Primary Care Physician: Carmen Piedra PA-C  A 32 year old man recently diagnosed with hepatitis A and is being managed supportively.    Subjective   Chief Complaint: Abdominal cramps and constipation. This is follow up of Hepatitis A infection  HPI:The patient says he has an abdominal pain, which is crampy in nature. No fever or chills. He sometimes feels nauseated. He had a BM overnight; his stool was like \"a rock\".  He was on Suboxone for 2 years, but quit taking his medication almost 2 weeks ago, because he felt the doctor was simply renewing his prescriptions without much explanation.  He was placed on Suboxone because of his history of prolonged opioid use.      Review of Systems   All pertinent negatives and positives are as above. All other systems have been reviewed and are negative unless otherwise stated.     Objective    Temp:  [97.8 °F (36.6 °C)-98.5 °F (36.9 °C)] 98.2 °F (36.8 °C)  Heart Rate:  [63-70] 66  Resp:  [16-20] 16  BP: ()/(56-72) 113/69  Physical exam:  Constitutional:  Well-developed and well-nourished.  No respiratory distress.      HENT:  Head:  Normocephalic and atraumatic.  Mouth:  Moist mucous membranes.    Eyes:  Conjunctivae and EOM are normal.  Pupils are equal, round.  No scleral icterus.    Neck:  Neck supple.  No JVD present.    Cardiovascular:  Normal rate, regular rhythm and normal heart sounds with no murmur.  Pulmonary/Chest:  No respiratory distress, no wheezes, no crackles, with normal breath sounds and good air movement.  Abdominal:  Soft.  Bowel sounds are normal.  No distension and no tenderness felt.   Musculoskeletal:  No edema, no tenderness, and no deformity.  No red or swollen joints.    Neurological:  Alert and oriented to person, place, and time.  No cranial nerve deficit.  No tongue deviation.  No facial droop.  No slurred " speech.   Skin:  Skin is warm and dry. No rash noted. No pallor.   Peripheral vascular:  Strong pulses in all 4 extremities with no clubbing, no cyanosis, no edema.      Results Review:  I have reviewed the labs, radiology results, and diagnostic studies.    Laboratory Data:   Lab Results (last 24 hours)     Procedure Component Value Units Date/Time    T4, Free [[id number]]  (Normal) Collected:  08/18/18 1359    Specimen:  Blood Updated:  08/19/18 1243     Free T4 1.21 ng/dL     CBC Auto Differential [[id number]]  (Abnormal) Collected:  08/19/18 0229    Specimen:  Blood Updated:  08/19/18 0422     WBC 2.77 (L) 10*3/mm3      RBC 4.91 10*6/mm3      Hemoglobin 14.6 g/dL      Hematocrit 44.0 %      MCV 89.6 fL      MCH 29.7 pg      MCHC 33.2 g/dL      RDW 14.4 %      RDW-SD 46.8 fl      MPV 12.2 (H) fL      Platelets 126 (L) 10*3/mm3     Scan Slide [[id number]] Collected:  08/19/18 0229    Specimen:  Blood Updated:  08/19/18 0422     Scan Slide --     Comment: See Manual Differential Results       Manual Differential [[id number]]  (Abnormal) Collected:  08/19/18 0229    Specimen:  Blood Updated:  08/19/18 0422     Neutrophil % 36.0 %      Lymphocyte % 39.0 %      Monocyte % 21.0 (H) %      Eosinophil % 3.0 %      Bands %  1.0 (L) %      Neutrophils Absolute 1.02 (L) 10*3/mm3      Lymphocytes Absolute 1.08 10*3/mm3      Monocytes Absolute 0.58 10*3/mm3      Eosinophils Absolute 0.08 10*3/mm3      Target Cells Slight/1+     Stomatocytes Slight/1+     Platelet Morphology Normal    Osmolality, Calculated [[id number]]  (Abnormal) Collected:  08/19/18 0229    Specimen:  Blood Updated:  08/19/18 0421     Osmolality Calc 269.5 (L) mOsm/kg     Comprehensive Metabolic Panel [[id number]]  (Abnormal) Collected:  08/19/18 0229    Specimen:  Blood Updated:  08/19/18 0421     Glucose 91 mg/dL      BUN 7 mg/dL      Creatinine 0.65 mg/dL      Sodium 136 mmol/L      Potassium 4.2 mmol/L      Comment: 1+ Hemolysis         Chloride 106 mmol/L       CO2 26.1 mmol/L      Calcium 8.1 mg/dL      Total Protein 6.0 g/dL      Albumin 3.10 (L) g/dL      ALT (SGPT) 4,155 (H) U/L      AST (SGOT) 3,409 (H) U/L      Alkaline Phosphatase 209 (H) U/L      Comment: Note New Reference Ranges        Total Bilirubin 5.8 (H) mg/dL      Comment: 1+ Icteric         eGFR Non African Amer 144 mL/min/1.73      Globulin 2.9 gm/dL      A/G Ratio 1.1 (L) g/dL      BUN/Creatinine Ratio 10.8     Anion Gap 3.9 mmol/L     Magnesium [[id number]]  (Normal) Collected:  08/19/18 0229    Specimen:  Blood Updated:  08/19/18 0358     Magnesium 2.5 mg/dL     Phosphorus [[id number]]  (Normal) Collected:  08/19/18 0229    Specimen:  Blood Updated:  08/19/18 0349     Phosphorus 2.7 mg/dL     aPTT [[id number]]  (Abnormal) Collected:  08/19/18 0229    Specimen:  Blood Updated:  08/19/18 0347     PTT 37.2 (H) seconds      Comment: Note new Reference Range       Narrative:       PTT Heparin Therapeutic Range:  59 - 95 seconds    Protime-INR [[id number]]  (Abnormal) Collected:  08/19/18 0229    Specimen:  Blood Updated:  08/19/18 0347     Protime 19.8 (H) Seconds      Comment: Note new Reference Range        INR 1.66 (H)    Narrative:       Suggested INR therapeutic range for stable oral anticoagulant therapy:    Low Intensity therapy:   1.5-2.0  Moderate Intensity therapy:   2.0-3.0  High Intensity therapy:   2.5-4.0          Culture Data:   No results found for: BLOODCX  No results found for: URINECX  No results found for: RESPCX  No results found for: WOUNDCX  No results found for: STOOLCX  No components found for: BODYFLD    Radiology Data:   Imaging Results (last 24 hours)     ** No results found for the last 24 hours. **          I have reviewed the patient current medications.     Assessment/Plan     Assessment and Plan:   ACUTE VIRAL HEPATITIS A   -Slight improvement in liver enzymes  -Continue supportive care  -Monitor    NONCOMPLIANCE  -Patient discontinued Suboxone abruptly after 2 year  use.  -Counseled to seek his doctor's guidance before discontinuing medications    ABDOMINAL PAIN  More in the central abdominal and suprapubic regions  CT abd was largely unimpressive  Prescribe Colace  Patient also gave a history of dysuria; will obtain repeat UA    GALLBLADDER WALL THICKENING   -Apparently chronic  -Will consult general surgery    TOBACCO ABUSE  -Counseled to quit  -Continue Nicotine patch      Discharge Planning: I expect patient to be discharged to home in 1-2 days.    Leandro Tracy MD   08/19/18   3:11 PM

## 2018-08-19 NOTE — PLAN OF CARE
Problem: Patient Care Overview  Goal: Plan of Care Review  Outcome: Ongoing (interventions implemented as appropriate)   08/19/18 1435   Coping/Psychosocial   Plan of Care Reviewed With patient       Problem: Pain, Acute (Adult)  Goal: Identify Related Risk Factors and Signs and Symptoms  Outcome: Ongoing (interventions implemented as appropriate)    Goal: Acceptable Pain Control/Comfort Level  Outcome: Ongoing (interventions implemented as appropriate)   08/19/18 1435   Pain, Acute (Adult)   Acceptable Pain Control/Comfort Level making progress toward outcome

## 2018-08-19 NOTE — PLAN OF CARE
Problem: Patient Care Overview  Goal: Plan of Care Review  Outcome: Ongoing (interventions implemented as appropriate)   08/18/18 2230 08/19/18 0311   Coping/Psychosocial   Plan of Care Reviewed With patient --    Plan of Care Review   Progress --  improving       Problem: Pain, Acute (Adult)  Goal: Identify Related Risk Factors and Signs and Symptoms  Outcome: Ongoing (interventions implemented as appropriate)    Goal: Acceptable Pain Control/Comfort Level  Outcome: Ongoing (interventions implemented as appropriate)

## 2018-08-20 LAB
ALBUMIN SERPL-MCNC: 2.9 G/DL (ref 3.5–5)
ALBUMIN/GLOB SERPL: 1.1 G/DL (ref 1.5–2.5)
ALP SERPL-CCNC: 192 U/L (ref 40–129)
ALT SERPL W P-5'-P-CCNC: 3945 U/L (ref 10–44)
ANION GAP SERPL CALCULATED.3IONS-SCNC: 3.3 MMOL/L (ref 3.6–11.2)
APTT PPP: 36.6 SECONDS (ref 23.8–36.1)
AST SERPL-CCNC: 2892 U/L (ref 10–34)
BACTERIA UR QL AUTO: ABNORMAL /HPF
BILIRUB SERPL-MCNC: 6.9 MG/DL (ref 0.2–1.8)
BILIRUB UR QL STRIP: ABNORMAL
BUN BLD-MCNC: 5 MG/DL (ref 7–21)
BUN/CREAT SERPL: 8.6 (ref 7–25)
CALCIUM SPEC-SCNC: 7.9 MG/DL (ref 7.7–10)
CHLORIDE SERPL-SCNC: 108 MMOL/L (ref 99–112)
CLARITY UR: ABNORMAL
CO2 SERPL-SCNC: 27.7 MMOL/L (ref 24.3–31.9)
COLOR UR: ABNORMAL
CREAT BLD-MCNC: 0.58 MG/DL (ref 0.43–1.29)
DEPRECATED RDW RBC AUTO: 41.9 FL (ref 37–54)
ERYTHROCYTE [DISTWIDTH] IN BLOOD BY AUTOMATED COUNT: 14.2 % (ref 11.5–14.5)
GFR SERPL CREATININE-BSD FRML MDRD: >150 ML/MIN/1.73
GLOBULIN UR ELPH-MCNC: 2.7 GM/DL
GLUCOSE BLD-MCNC: 92 MG/DL (ref 70–110)
GLUCOSE UR STRIP-MCNC: NEGATIVE MG/DL
HCT VFR BLD AUTO: 38.6 % (ref 42–52)
HGB BLD-MCNC: 13.5 G/DL (ref 14–18)
HGB UR QL STRIP.AUTO: NEGATIVE
HYALINE CASTS UR QL AUTO: ABNORMAL /LPF
INR PPP: 1.63 (ref 0.9–1.1)
KETONES UR QL STRIP: ABNORMAL
LEUKOCYTE ESTERASE UR QL STRIP.AUTO: ABNORMAL
MCH RBC QN AUTO: 29.1 PG (ref 27–33)
MCHC RBC AUTO-ENTMCNC: 35 G/DL (ref 33–37)
MCV RBC AUTO: 83.2 FL (ref 80–94)
NITRITE UR QL STRIP: POSITIVE
OSMOLALITY SERPL CALC.SUM OF ELEC: 274.4 MOSM/KG (ref 273–305)
PH UR STRIP.AUTO: 6 [PH] (ref 5–8)
PLATELET # BLD AUTO: 184 10*3/MM3 (ref 130–400)
PMV BLD AUTO: 11.7 FL (ref 6–10)
POTASSIUM BLD-SCNC: 4 MMOL/L (ref 3.5–5.3)
PROT SERPL-MCNC: 5.6 G/DL (ref 6–8)
PROT UR QL STRIP: ABNORMAL
PROTHROMBIN TIME: 19.5 SECONDS (ref 11–15.4)
RBC # BLD AUTO: 4.64 10*6/MM3 (ref 4.7–6.1)
RBC # UR: ABNORMAL /HPF
REF LAB TEST METHOD: ABNORMAL
SODIUM BLD-SCNC: 139 MMOL/L (ref 135–153)
SP GR UR STRIP: 1.02 (ref 1–1.03)
SQUAMOUS #/AREA URNS HPF: ABNORMAL /HPF
UROBILINOGEN UR QL STRIP: ABNORMAL
WBC NRBC COR # BLD: 3.35 10*3/MM3 (ref 4.5–12.5)
WBC UR QL AUTO: ABNORMAL /HPF

## 2018-08-20 PROCEDURE — 81001 URINALYSIS AUTO W/SCOPE: CPT | Performed by: INTERNAL MEDICINE

## 2018-08-20 PROCEDURE — 85610 PROTHROMBIN TIME: CPT | Performed by: PHYSICIAN ASSISTANT

## 2018-08-20 PROCEDURE — 25010000002 DICYCLOMINE PER 20 MG: Performed by: INTERNAL MEDICINE

## 2018-08-20 PROCEDURE — 99232 SBSQ HOSP IP/OBS MODERATE 35: CPT | Performed by: HOSPITALIST

## 2018-08-20 PROCEDURE — 87086 URINE CULTURE/COLONY COUNT: CPT | Performed by: INTERNAL MEDICINE

## 2018-08-20 PROCEDURE — 25010000002 KETOROLAC TROMETHAMINE PER 15 MG: Performed by: HOSPITALIST

## 2018-08-20 PROCEDURE — 85730 THROMBOPLASTIN TIME PARTIAL: CPT | Performed by: PHYSICIAN ASSISTANT

## 2018-08-20 PROCEDURE — 80053 COMPREHEN METABOLIC PANEL: CPT | Performed by: INTERNAL MEDICINE

## 2018-08-20 PROCEDURE — 85027 COMPLETE CBC AUTOMATED: CPT | Performed by: INTERNAL MEDICINE

## 2018-08-20 PROCEDURE — 94799 UNLISTED PULMONARY SVC/PX: CPT

## 2018-08-20 RX ORDER — DICYCLOMINE HCL 20 MG
20 TABLET ORAL 4 TIMES DAILY
Status: DISCONTINUED | OUTPATIENT
Start: 2018-08-20 | End: 2018-08-21 | Stop reason: HOSPADM

## 2018-08-20 RX ORDER — LACTULOSE 10 G/15ML
10 SOLUTION ORAL 2 TIMES DAILY
Status: DISCONTINUED | OUTPATIENT
Start: 2018-08-20 | End: 2018-08-21 | Stop reason: HOSPADM

## 2018-08-20 RX ORDER — KETOROLAC TROMETHAMINE 30 MG/ML
15 INJECTION, SOLUTION INTRAMUSCULAR; INTRAVENOUS ONCE
Status: COMPLETED | OUTPATIENT
Start: 2018-08-20 | End: 2018-08-20

## 2018-08-20 RX ADMIN — LIDOCAINE HYDROCHLORIDE 5 ML: 20 SOLUTION ORAL; TOPICAL at 18:14

## 2018-08-20 RX ADMIN — SODIUM CHLORIDE 150 ML/HR: 9 INJECTION, SOLUTION INTRAVENOUS at 01:07

## 2018-08-20 RX ADMIN — Medication 1 TABLET: at 08:14

## 2018-08-20 RX ADMIN — DOCUSATE SODIUM 100 MG: 100 CAPSULE, LIQUID FILLED ORAL at 21:26

## 2018-08-20 RX ADMIN — LIDOCAINE HYDROCHLORIDE 5 ML: 20 SOLUTION ORAL; TOPICAL at 10:42

## 2018-08-20 RX ADMIN — LIDOCAINE HYDROCHLORIDE 5 ML: 20 SOLUTION ORAL; TOPICAL at 01:07

## 2018-08-20 RX ADMIN — LIDOCAINE HYDROCHLORIDE 5 ML: 20 SOLUTION ORAL; TOPICAL at 23:44

## 2018-08-20 RX ADMIN — HYDROXYZINE HYDROCHLORIDE 50 MG: 25 TABLET ORAL at 20:34

## 2018-08-20 RX ADMIN — SODIUM CHLORIDE 150 ML/HR: 9 INJECTION, SOLUTION INTRAVENOUS at 08:40

## 2018-08-20 RX ADMIN — DICYCLOMINE HYDROCHLORIDE 20 MG: 20 INJECTION, SOLUTION INTRAMUSCULAR at 11:17

## 2018-08-20 RX ADMIN — LACTULOSE 10 G: 20 SOLUTION ORAL at 14:22

## 2018-08-20 RX ADMIN — SODIUM CHLORIDE 150 ML/HR: 9 INJECTION, SOLUTION INTRAVENOUS at 16:54

## 2018-08-20 RX ADMIN — NICOTINE 1 PATCH: 21 PATCH TRANSDERMAL at 08:15

## 2018-08-20 RX ADMIN — IBUPROFEN 600 MG: 600 TABLET ORAL at 01:45

## 2018-08-20 RX ADMIN — LACTULOSE 10 G: 20 SOLUTION ORAL at 20:35

## 2018-08-20 RX ADMIN — DICYCLOMINE HYDROCHLORIDE 20 MG: 20 INJECTION, SOLUTION INTRAMUSCULAR at 08:15

## 2018-08-20 RX ADMIN — LIDOCAINE HYDROCHLORIDE 5 ML: 20 SOLUTION ORAL; TOPICAL at 20:34

## 2018-08-20 RX ADMIN — DICYCLOMINE HYDROCHLORIDE 20 MG: 20 TABLET ORAL at 14:22

## 2018-08-20 RX ADMIN — IBUPROFEN 600 MG: 600 TABLET ORAL at 17:52

## 2018-08-20 RX ADMIN — DICYCLOMINE HYDROCHLORIDE 20 MG: 20 TABLET ORAL at 21:26

## 2018-08-20 RX ADMIN — DOCUSATE SODIUM 100 MG: 100 CAPSULE, LIQUID FILLED ORAL at 08:14

## 2018-08-20 RX ADMIN — KETOROLAC TROMETHAMINE 15 MG: 30 INJECTION, SOLUTION INTRAMUSCULAR; INTRAVENOUS at 14:05

## 2018-08-20 NOTE — PAYOR COMM NOTE
"[facility]  NPI:[id number]    Utilization Review  Contact: Maura Hugo RN  Phone: [phone]  Fax:[phone]    INITIATE INPATIENT AUTHORIZATION    ICD: B15.9    E80.6    Gregoria Sandhu  (32 y.o. Male)     Date of Birth Social Security Number Address Home Phone MRN    1985  [id number] [facility] [address] [phone] [id number]    Uats[place] Marital Status          Quaker Single       Admission Date Admission Type Admitting Provider Attending Provider Department, Room/Bed    8/18/18 Emergency Leandro Tracy MD Srinivas, Priyanka, MD [address], [address]/    Discharge Date Discharge Disposition Discharge Destination                       Attending Provider:  Serina Pagan MD    Allergies:  No Known Allergies    Isolation:  None   Infection:  None   Code Status:  CPR    Ht:  185.4 cm (73\")   Wt:  80.5 kg (177 lb 7 oz)    Admission Cmt:  None   Principal Problem:  None                Active Insurance as of 8/18/2018     Primary Coverage     Payor Plan Insurance Group Employer/Plan Group    HU[employer]NA MEDICAID [employer] [facility]     Payor Plan Address Payor Plan Phone Number Effective From Effective To    PO  [phone] 10/1/2016     [facility] [id number]       Subscriber Name Subscriber Birth Date Member ID       GREGORIA SANDHU 1985 [id number]                 Emergency Contacts      (Rel.) Home Phone Work Phone Mobile Phone    Sonu Payan (Grandparent) [phone] -- --               History & Physical      Davina Gutierrez PA at 8/18/2018 12:19 PM     Attestation signed by Leandro Tracy MD at 8/18/2018  5:46 PM    I have personally seen and examined the patient .  I have reviewed the PA's note.  I have reviewed the patient's chart.  I agree and concur with the PA's diagnosis and treatments.      Leandro Tracy MD                       [facility] HOSPITALIST HISTORY AND " "PHYSICAL    Patient Identification:  Name:  Chuy Sandhu  Age:  32 y.o.  Sex:  male  :  1985  MRN:  [id number]   Visit Number:  [id number]  Primary Care Physician:  Carmen Piedra PA-C     Subjective     Chief complaint:   Chief Complaint   Patient presents with   • Abdominal Pain   • Dysuria     History of presenting illness:   Patient is a 32 y.o. male with no significant past medical history that presented to the [facility] emergency department for evaluation of abdominal pain. Patient states that for approximately two weeks he has had intermittent fever, T max 102) and headaches. Patient states that over the past couple of days he has developed lower abdominal pain for which he locates in the suprapubic region. Patient states that over the past three days he has also developed nausea and emesis. Patient denies diarrhea, but states that he had constipation. He states that he took stool softeners with relief of his constipation, however his abdominal discomfort resumed regardless. Patient also reports dark cloudy urine with foul odor, he also admits to dysuria and urinary frequency. He denies any history of STDs. His girlfriend whom is pregnant is present at bedside, reports monogamous relationship. Patient reports that he thought it might have been a stomach bug, so he treated himself with ibuprofen. But due to no symptomatic improvement and worsening abdominal pain, he came to ED for evaluation. Patient denies any ill contacts. He works at [employer] in [facility]. He denies any IVDA or substance abuse. He denies any home medications, however ABDIAZIZ reports regular prescribing of Suboxone, last filled 2018. I asked patient and he reports he was \"recently\" started on the medication but has not been taking it and last dose was approximately two weeks ago. His UDS is positive for suboxone as well as amphetamines and THC, he denies any amphetamines but does use THC " recreationally. He denies any chest pain or shortness of breath. His abdominal pain resumes but slightly improved since time of arrival to ED.     Upon arrival to the ED, vitals were temperature 98.7, HR 87, RR 16, /74, and oxygen saturation 98% on room air.  CMP with alkaline phosphatase 268, PLT 4210, AST 3590, and bilirubin total 5.4.  CBC with WBC 3.40, otherwise unremarkable.  Urinalysis with nelson color, 3+ ketones, positive nitrite, trace leukocyte esterase, 1+ protein, 3+ bilirubin, 3-5 RBC, 0 WBC, and no bacteria noted.  Urine drug screen positive for amphetamines, Suboxone, and THC.  Chlamydia and gonorrhea negative.  Hepatitis panel positive for hepatitis A IgM.  INR elevated at 1.45 and APTT 38.0.  While in the emergency department, patient was given a dose of 30 mg IV Toradol, 4 mg IV Zofran, and 5 mL viscous lidocaine 2%.  Patient was also given a 1 L bolus normal saline.    Patient has been placed in observation status on the medical surgical floor for further evaluation and treatment.   ---------------------------------------------------------------------------------------------------------------------   Review of Systems   Constitutional: Positive for activity change, appetite change, fatigue and fever. Negative for chills and diaphoresis.   HENT: Negative for congestion, postnasal drip, rhinorrhea, sinus pain, sore throat and trouble swallowing.    Eyes: Negative for discharge and visual disturbance.   Respiratory: Negative for cough, chest tightness, shortness of breath and wheezing.    Cardiovascular: Negative for chest pain, palpitations and leg swelling.   Gastrointestinal: Positive for abdominal pain, constipation, nausea and vomiting. Negative for diarrhea.   Endocrine: Negative for cold intolerance and heat intolerance.   Genitourinary: Positive for dysuria, frequency and urgency. Negative for decreased urine volume, difficulty urinating, discharge, enuresis, flank pain, genital sores,  hematuria, scrotal swelling and testicular pain.   Musculoskeletal: Negative for arthralgias, gait problem and myalgias.   Skin: Negative for color change, rash and wound.   Allergic/Immunologic: Negative for environmental allergies and immunocompromised state.   Neurological: Negative for dizziness, syncope, weakness, light-headedness and headaches.   Hematological: Negative for adenopathy. Does not bruise/bleed easily.   Psychiatric/Behavioral: Negative for confusion and decreased concentration. The patient is not nervous/anxious.       ---------------------------------------------------------------------------------------------------------------------   History reviewed. No pertinent past medical history.  History reviewed. No pertinent surgical history.  Family History   Problem Relation Age of Onset   • Diabetes Father      Social History     Social History   • Marital status: Single     Social History Main Topics   • Smoking status: Current Every Day Smoker     Packs/day: 1.00     Types: Cigarettes   • Alcohol use No   • Drug use: No   • Sexual activity: Defer     Other Topics Concern   • Not on file     ---------------------------------------------------------------------------------------------------------------------   Allergies:  Patient has no known allergies.  ---------------------------------------------------------------------------------------------------------------------   Prior to Admission Medications     None        ---------------------------------------------------------------------------------------------------------------------    Objective     Hospital Scheduled Meds:    multivitamin with minerals 1 tablet Oral Daily   nicotine 1 patch Transdermal Q24H       sodium chloride 100 mL/hr Last Rate: 100 mL/hr (08/18/18 1437)     ---------------------------------------------------------------------------------------------------------------------   Vital Signs:  Temp:  [97.5 °F (36.4 °C)-98.7 °F  (37.1 °C)] 97.5 °F (36.4 °C)  Heart Rate:  [72-87] 72  Resp:  [16-18] 18  BP: (111-135)/(67-85) 111/70  Mean Arterial Pressure (Non-Invasive) for the past 24 hrs (Last 3 readings):   Noninvasive MAP (mmHg)   08/18/18 1202 99   08/18/18 1132 95   08/18/18 1102 92     SpO2 Percentage    08/18/18 1202 08/18/18 1317 08/18/18 1453   SpO2: 98% 98% 99%     SpO2:  [98 %-99 %] 99 %  on   ;        Body mass index is 23.75 kg/m².  Wt Readings from Last 3 Encounters:   08/18/18 81.6 kg (180 lb)   10/04/16 81.6 kg (180 lb)     ---------------------------------------------------------------------------------------------------------------------   Physical Exam:  Constitutional:  Well-developed and well-nourished.  No respiratory distress.  Ill appearing.  Nontoxic.  T.     HENT:  Head: Normocephalic and atraumatic.  Mouth:  Moist mucous membranes.    Eyes:  Conjunctivae and EOM are normal.  Pupils are equal, round, and reactive to light.  No scleral icterus.  Neck:  Neck supple.  No JVD present.    Cardiovascular:  Normal rate, regular rhythm and normal heart sounds with no murmur.  Pulmonary/Chest:  No respiratory distress, no wheezes, no crackles, with normal breath sounds and good air movement.  Abdominal:  Soft.  Bowel sounds are normal.  No distension.  Suprapubic tenderness, otherwise no tenderness noted.  There is no rebound or guarding.    Musculoskeletal:  No edema, no tenderness, and no deformity.  No red or swollen joints anywhere.    Neurological:  Alert and oriented to person, place, and time.  No cranial nerve deficit.  No tongue deviation.  No facial droop.  No slurred speech.   Skin:  Skin is warm and dry.  No rash noted.  No pallor.  Nonicteric.    Psychiatric:  Normal mood and affect.  Behavior is normal.  Judgment and thought content normal.   Peripheral vascular:  No edema and strong pulses on all 4 extremities. Cap refill < 3 seconds.  Genitourinary: No borja catheter in place, making urine.    ---------------------------------------------------------------------------------------------------------------------  EKG:    Ordered and pending    Telemetry:    Patient is not currently on telemetry monitoring.    I have personally reviewed the EKG/Telemetry strip  ---------------------------------------------------------------------------------------------------------------------               Results from last 7 days  Lab Units 08/18/18  1207 08/18/18  0828   WBC 10*3/mm3  --  3.40*   HEMOGLOBIN g/dL  --  15.3   HEMATOCRIT %  --  43.6   MCV fL  --  83.8   MCHC g/dL  --  35.1   PLATELETS 10*3/mm3  --  177   INR  1.45*  --        Results from last 7 days  Lab Units 08/18/18  0828   SODIUM mmol/L 135   POTASSIUM mmol/L 4.1   MAGNESIUM mg/dL 1.8   CHLORIDE mmol/L 102   CO2 mmol/L 27.7   BUN mg/dL 12   CREATININE mg/dL 0.65   EGFR IF NONAFRICN AM mL/min/1.73 144   CALCIUM mg/dL 9.2   GLUCOSE mg/dL 89   ALBUMIN g/dL 3.90   BILIRUBIN mg/dL 5.4*   ALK PHOS U/L 268*   AST (SGOT) U/L 3,590*   ALT (SGPT) U/L 4,210*   Estimated Creatinine Clearance: 191.8 mL/min (by C-G formula based on SCr of 0.65 mg/dL).  No results found for: AMMONIA    Hemoglobin A1C   Date/Time Value Ref Range Status   08/18/2018 0828 5.30 4.50 - 5.70 % Final     Lab Results   Component Value Date    HGBA1C 5.30 08/18/2018     No results found for: TSH, FREET4                        I have personally reviewed the above laboratory results.   ---------------------------------------------------------------------------------------------------------------------  Imaging Results (last 7 days)     Procedure Component Value Units Date/Time    CT Abdomen Pelvis Stone Protocol [[id number]] Collected:  08/18/18 0943     Updated:  08/18/18 0947    Narrative:       Findings  LOWER THORAX: Clear. No effusions.  ABDOMEN:     LIVER: SOME PERIPORTAL EDEMA AROUND THE LIVER.     GALLBLADDER: NONSPECIFIC DENSITY WITHIN THE GALLBLADDER THAT MAY  REPRESENT SLUDGE.      PANCREAS: Unremarkable. No mass or ductal dilatation.     SPLEEN: Homogeneous. No splenomegaly.     ADRENALS: No mass.     KIDNEYS: No mass. No obstructive uropathy.  No evidence of  urolithiasis.     GI TRACT: Non-dilated. No definite wall thickening.     PERITONEUM: TINY ASCITES OR FLUID WITHIN THE PELVIS.     MESENTERY: Unremarkable.     LYMPH NODES: No lymphadenopathy.     VASCULATURE: No evidence of aneurysm.     ABDOMINAL WALL: No focal hernia or mass.     OTHER: None.  PELVIS:     BLADDER: No focal mass or significant wall thickening     REPRODUCTIVE: Unremarkable as visualized.     APPENDIX: Nondistended. No surrounding inflammation.  BONES: No acute bony abnormality.    Impression:       Impression:  Some periportal edema and tiny ascites within the pelvis with associated  gallbladder wall thickening that could represent hepatitis or  potentially gallbladder disease.     This report was finalized on 8/18/2018 9:45 AM by Dr. Louis Crisostomo MD.      I have personally reviewed the above radiology results.   ---------------------------------------------------------------------------------------------------------------------    Assessment & Plan      -Acute viral hepatitis A   -Hyperbilirubinemia   -Periportal edema with tiny ascites  -Gallbladder wall thickening likely cholestasis in setting of hepatitis  -Mild leukopenia   -Urine drug screen positive for amphetamines, Suboxone, and THC  -Tobacco abuse     Patient has been placed in observation status on the medical surgical floor for further evaluation and treatment.  Continue supportive care for acute hepatitis A treatment.  Continue gentle IV fluid hydration, will let the patient have clear liquid diet and advance as tolerated.  As needed anti-emetics available.  Follow temperature curve for any fevers, will treat with ibuprofen/NSAIDs as needed.  Avoid any further hepatotoxic agents.  Closely monitor alkaline phosphatase and transaminases as well as  bilirubin level.  Patient does have gallbladder wall thickening on CT scan with possible appearance of sludge, this is likely cholestasis in setting of acute hepatitis.  Patient had benign gallbladder exam.  Will hold off on further workup of gallbladder at this time as patient's symptoms most likely due to acute hepatitis.    Patient educated on transmission of hepatitis A virus.  Patient educated on good hand hygiene.  Recommend he contact anyone that he has had close contact with her cook for to get the hepatitis A vaccine.  I did contact her laboratory in house, all hepatitis A cases are being reported to state for close monitoring.  Once LFTs trending down, consider giving patient hepatitis A vaccine.  Patient's girlfriend present at bedside who is pregnant, recommended to see OB/GYN for possible vaccination/post exposure prophylaxis.    Patient's urine to be sent for culture, follow for final results.  Hold off on antibiotic therapy for now as patient is afebrile, white blood cell count is within normal limits, and as no WBC or bacteria noted on urinalysis.    Tobacco Cessation Counselin minutes of tobacco cessation counseling provided, including but not limited to, risks of ongoing tobacco use, pertinence to current or future health status and availability/examples of cessation resources.  Patient expresses desire to quit.  Nicotine patch ordered.    Patient denies any history of drug use or active drug use other than THC.  He does state he was recently prescribed Suboxone, however he only tried it a few times and last dose was approximately 2 weeks ago.  I asked the patient he was on Suboxone therapy for history of drug abuse, he adamantly reports that he has no history.  Jacky report was reviewed, patient has been routinely getting Suboxone prescriptions by Dr. huddleston in [facility] since 2017.  I will not order this medication during this admission as patient states he does not take  it, however his urine drug screen is positive.  Patient denies any amphetamine usage, he states he does not know how his urine drug screen is positive for this.  He has not had any track marks on exam.  I will go ahead and send an HIV testing.    Continue to closely monitor electrolytes and replace per protocol as necessary. Will review patient's home medications once reconciled per pharmacy and resume as appropriate. Will repeat labs in AM and continue to closely monitor the patient.       DVT Prophylaxis: SQ heparin   Activity: Ad brian     The patient is considered to be a high risk patient due to: Hepatitis A, positive urine drug screen, tobacco abuse, periportal edema    OBSERVATION status, however if further evaluation or treatment plans warrant, status may change.  Based upon current information, I predict patient's care encounter to be less than or equal to 2 midnights.    Code Status: FULL CODE    I have discussed the patient's assessment and plan with the patient and patient's daughter present at bedside.     CELESTINE Jim  Hospitalist Service  08/18/18  3:03 PM  ---------------------------------------------------------------------------------------------------------------------   Physician Attestation: I have seen and examined the patient. I agree with the assessment and plan of Davina Gutierrez PA-C. I have amended the above note to reflect my findings in history, physical examination, decision making and management.  plan.       Electronically signed by Leandro Tracy MD at 8/18/2018  5:46 PM          Emergency Department Notes      Clyde Aguillon PA at 8/18/2018  8:20 AM     Attestation signed by Lio Burgos MD at 8/18/2018  7:11 PM          For this patient encounter, I reviewed the NP or PA documentation, treatment plan, and medical decision making. Lio Burgos MD 8/18/2018 7:11 PM                  Subjective   32-year-old white male presents secondary to lower abdominal discomfort  with burning on urination.  He denies any discharge.  No testicular pain.  Sates that he had discomfort over the past 5 days that worse since yesterday.  He reports having nausea though no vomiting.  He previously had a headache.  He states he had a fever up to 102 a few days ago.  He denies any history of STD.  He denies any questionable encounters.  He voices no other complaints.            Review of Systems   Constitutional: Negative.  Negative for fever.   HENT: Negative.    Respiratory: Negative.    Cardiovascular: Negative.  Negative for chest pain.   Gastrointestinal: Positive for abdominal pain and nausea. Negative for diarrhea and vomiting.   Endocrine: Negative.    Genitourinary: Positive for difficulty urinating, dysuria and frequency. Negative for penile pain, penile swelling, scrotal swelling and urgency.   Skin: Negative.    Neurological: Negative.    Psychiatric/Behavioral: Negative.    All other systems reviewed and are negative.      History reviewed. No pertinent past medical history.    No Known Allergies    History reviewed. No pertinent surgical history.    History reviewed. No pertinent family history.    Social History     Social History   • Marital status: Single     Social History Main Topics   • Smoking status: Current Every Day Smoker     Packs/day: 1.00     Types: Cigarettes   • Alcohol use No   • Drug use: No   • Sexual activity: Defer     Other Topics Concern   • Not on file           Objective   Physical Exam   Constitutional: He is oriented to person, place, and time. He appears well-developed and well-nourished. No distress.   HENT:   Head: Normocephalic and atraumatic.   Right Ear: External ear normal.   Left Ear: External ear normal.   Nose: Nose normal.   Eyes: Pupils are equal, round, and reactive to light. Conjunctivae and EOM are normal.   Neck: Normal range of motion. Neck supple. No JVD present. No tracheal deviation present.   Cardiovascular: Normal rate, regular rhythm and  normal heart sounds.    No murmur heard.  Pulmonary/Chest: Effort normal and breath sounds normal. No respiratory distress. He has no wheezes.   Abdominal: Soft. Bowel sounds are normal. There is tenderness (suprpubic region. ). There is no rebound and no guarding.   Musculoskeletal: Normal range of motion. He exhibits no edema or deformity.   Neurological: He is alert and oriented to person, place, and time. No cranial nerve deficit.   Skin: Skin is warm and dry. No rash noted. He is not diaphoretic. No erythema. No pallor.   Psychiatric: He has a normal mood and affect. His behavior is normal. Thought content normal.   Nursing note and vitals reviewed.      Procedures          ED Course  ED Course as of Aug 18 1353   Sat Aug 18, 2018   0957 Patient counseled about his elevated liver enzymes.  Denies any IV drug use at all.  He states that he used to be on Suboxone.  He is not sure how his methamphetamine screen is positive.  He denies any upper abdominal discomfort.  He states this pain is mostly than lower.  He voices no other complaints at this time.  [JI]   1348 Previously d/w Dr Burgos- admit. D/w Dr Gaona- obs admit.  [JI]      ED Course User Index  [JI] Clyde Aguillon PA                  MDM  Number of Diagnoses or Management Options  Viral hepatitis A without hepatic coma: new and requires workup     Amount and/or Complexity of Data Reviewed  Clinical lab tests: reviewed and ordered  Tests in the radiology section of CPT®:  reviewed and ordered  Discuss the patient with other providers: yes    Risk of Complications, Morbidity, and/or Mortality  Presenting problems: moderate          Final diagnoses:   Viral hepatitis A without hepatic coma            Clyde Aguillon PA  08/18/18 1353      Electronically signed by Lio Burgos MD at 8/18/2018  7:11 PM       Hospital Medications (all)       Dose Frequency Start End    aluminum-magnesium hydroxide-simethicone (MAALOX MAX) 400-400-40 MG/5ML suspension 15  mL 15 mL Every 6 Hours PRN 8/18/2018     Sig - Route: Take 15 mL by mouth Every 6 (Six) Hours As Needed for Indigestion or Heartburn. - Oral    Cosign for Ordering: Accepted by Leandro Tracy MD on 8/18/2018  5:28 PM    dicyclomine (BENTYL) injection 20 mg 20 mg 4 Times Daily 8/19/2018     Sig - Route: Inject 2 mL into the appropriate muscle as directed by prescriber 4 (Four) Times a Day. - Intramuscular    docusate sodium (COLACE) capsule 100 mg 100 mg 2 Times Daily 8/19/2018     Sig - Route: Take 1 capsule by mouth 2 (Two) Times a Day. - Oral    hydrOXYzine (ATARAX) tablet 50 mg 50 mg Nightly PRN 8/19/2018     Sig - Route: Take 2 tablets by mouth At Night As Needed for Sleep. - Oral    Cosign for Ordering: Accepted by Leandro Tracy MD on 8/19/2018  4:08 PM    ibuprofen (ADVIL,MOTRIN) tablet 600 mg 600 mg Every 6 Hours PRN 8/18/2018     Sig - Route: Take 1 tablet by mouth Every 6 (Six) Hours As Needed for Mild Pain  or Fever. - Oral    Cosign for Ordering: Accepted by Leandro Tracy MD on 8/18/2018  5:28 PM    ketorolac (TORADOL) injection 30 mg 30 mg Once 8/18/2018 8/18/2018    Sig - Route: Infuse 30 mg into a venous catheter 1 (One) Time. - Intravenous    Cosign for Ordering: Accepted by Lio Burgos MD on 8/18/2018 10:59 AM    lidocaine viscous (XYLOCAINE) 2 % mouth solution 5 mL 5 mL Once 8/18/2018 8/18/2018    Sig - Route: Apply 5 mL to the mouth or throat 1 (One) Time. - Mouth/Throat    Cosign for Ordering: Accepted by Lio Burgos MD on 8/18/2018  7:22 PM    lidocaine viscous (XYLOCAINE) 2 % mouth solution 5 mL 5 mL Every 3 Hours PRN 8/18/2018     Sig - Route: Apply 5 mL to the mouth or throat Every 3 (Three) Hours As Needed for Mouth Pain or Other (abdominal pain). - Mouth/Throat    Cosign for Ordering: Accepted by Leandro Tracy MD on 8/18/2018  5:28 PM    Magnesium Sulfate 2 gram / 50mL Infusion (GIVE X 3 BAGS TO EQUAL 6GM TOTAL DOSE) - Mg 1.1 - 1.5 mg/dl 2 g As Needed 8/18/2018      "Sig - Route: Infuse 50 mL into a venous catheter As Needed (See Administration Instructions). - Intravenous    Cosign for Ordering: Accepted by Leandro Tracy MD on 8/18/2018  5:28 PM    Linked Group 1:  \"Or\" Linked Group Details        Magnesium Sulfate 2 gram Bolus, followed by 8 gram infusion (total Mg dose 10 grams)- Mg less than or equal to 1mg/dL 2 g As Needed 8/18/2018     Sig - Route: Infuse 50 mL into a venous catheter As Needed (See Administration Instructions). - Intravenous    Cosign for Ordering: Accepted by Leandro Tracy MD on 8/18/2018  5:28 PM    Linked Group 1:  \"Or\" Linked Group Details        Magnesium Sulfate 4 gram infusion- Mg 1.6-1.9 mg/dL 4 g As Needed 8/18/2018     Sig - Route: Infuse 100 mL into a venous catheter As Needed (See Administration Instructions). - Intravenous    Cosign for Ordering: Accepted by Leandro Tracy MD on 8/18/2018  5:28 PM    Linked Group 1:  \"Or\" Linked Group Details        magnesium sulfate 4g/100mL (PREMIX) infusion 4 g Once 8/18/2018 8/18/2018    Sig - Route: Infuse 100 mL into a venous catheter 1 (One) Time. - Intravenous    multivitamin with minerals 1 tablet 1 tablet Daily 8/18/2018     Sig - Route: Take 1 tablet by mouth Daily. - Oral    Cosign for Ordering: Accepted by Leandro Tracy MD on 8/18/2018  5:28 PM    nicotine (NICODERM CQ) 21 MG/24HR patch 1 patch 1 patch Every 24 Hours Scheduled 8/18/2018     Sig - Route: Place 1 patch on the skin as directed by provider Daily. - Transdermal    Cosign for Ordering: Accepted by Leandro Tracy MD on 8/18/2018  5:28 PM    ondansetron (ZOFRAN) injection 4 mg 4 mg Once 8/18/2018 8/18/2018    Sig - Route: Infuse 2 mL into a venous catheter 1 (One) Time. - Intravenous    Cosign for Ordering: Accepted by Lio Burgos MD on 8/18/2018 10:59 AM    ondansetron (ZOFRAN) injection 4 mg 4 mg Once 8/19/2018 8/19/2018    Sig - Route: Infuse 2 mL into a venous catheter 1 (One) Time. - Intravenous    Cosign " "for Ordering: Accepted by Leandro Tracy MD on 8/19/2018  6:53 PM    sodium chloride 0.9 % bolus 1,000 mL 1,000 mL Once 8/18/2018 8/18/2018    Sig - Route: Infuse 1,000 mL into a venous catheter 1 (One) Time. - Intravenous    Cosign for Ordering: Accepted by Lio Burgos MD on 8/18/2018 10:59 AM    sodium chloride 0.9 % flush 1-10 mL 1-10 mL As Needed 8/18/2018     Sig - Route: Infuse 1-10 mL into a venous catheter As Needed for Line Care. - Intravenous    Cosign for Ordering: Accepted by Leandro Tracy MD on 8/18/2018  5:28 PM    sodium chloride 0.9 % flush 10 mL 10 mL As Needed 8/18/2018     Sig - Route: Infuse 10 mL into a venous catheter As Needed for Line Care. - Intravenous    Cosign for Ordering: Accepted by Lio Burgos MD on 8/18/2018 10:59 AM    Linked Group 2:  \"And\" Linked Group Details        sodium chloride 0.9 % infusion 150 mL/hr Continuous 8/18/2018     Sig - Route: Infuse 150 mL/hr into a venous catheter Continuous. - Intravenous    Cosign for Ordering: Accepted by Leandro Tracy MD on 8/18/2018  5:28 PM    zolpidem (AMBIEN) tablet 5 mg (Discontinued) 5 mg Nightly PRN 8/18/2018 8/19/2018    Sig - Route: Take 1 tablet by mouth At Night As Needed for Sleep. - Oral             Physician Progress Notes (all)      Leandro Tracy MD at 8/19/2018  3:10 PM              [facility]  INPATIENT PROGRESS NOTE    Length of Stay: 0  Date of Admission: 8/18/2018  Primary Care Physician: Carmen Piedra PA-C  A 32 year old man recently diagnosed with hepatitis A and is being managed supportively.    Subjective   Chief Complaint: Abdominal cramps and constipation. This is follow up of Hepatitis A infection  HPI:The patient says he has an abdominal pain, which is crampy in nature. No fever or chills. He sometimes feels nauseated. He had a BM overnight; his stool was like \"a rock\".  He was on Suboxone for 2 years, but quit taking his medication almost 2 weeks " ago, because he felt the doctor was simply renewing his prescriptions without much explanation.  He was placed on Suboxone because of his history of prolonged opioid use.      Review of Systems   All pertinent negatives and positives are as above. All other systems have been reviewed and are negative unless otherwise stated.     Objective    Temp:  [97.8 °F (36.6 °C)-98.5 °F (36.9 °C)] 98.2 °F (36.8 °C)  Heart Rate:  [63-70] 66  Resp:  [16-20] 16  BP: ()/(56-72) 113/69  Physical exam:  Constitutional:  Well-developed and well-nourished.  No respiratory distress.      HENT:  Head:  Normocephalic and atraumatic.  Mouth:  Moist mucous membranes.    Eyes:  Conjunctivae and EOM are normal.  Pupils are equal, round.  No scleral icterus.    Neck:  Neck supple.  No JVD present.    Cardiovascular:  Normal rate, regular rhythm and normal heart sounds with no murmur.  Pulmonary/Chest:  No respiratory distress, no wheezes, no crackles, with normal breath sounds and good air movement.  Abdominal:  Soft.  Bowel sounds are normal.  No distension and no tenderness felt.   Musculoskeletal:  No edema, no tenderness, and no deformity.  No red or swollen joints.    Neurological:  Alert and oriented to person, place, and time.  No cranial nerve deficit.  No tongue deviation.  No facial droop.  No slurred speech.   Skin:  Skin is warm and dry. No rash noted. No pallor.   Peripheral vascular:  Strong pulses in all 4 extremities with no clubbing, no cyanosis, no edema.      Results Review:  I have reviewed the labs, radiology results, and diagnostic studies.    Laboratory Data:   Lab Results (last 24 hours)     Procedure Component Value Units Date/Time    T4, Free [[id number]]  (Normal) Collected:  08/18/18 1359    Specimen:  Blood Updated:  08/19/18 1243     Free T4 1.21 ng/dL     CBC Auto Differential [[id number]]  (Abnormal) Collected:  08/19/18 0229    Specimen:  Blood Updated:  08/19/18 0422     WBC 2.77 (L) 10*3/mm3      RBC 4.91  10*6/mm3      Hemoglobin 14.6 g/dL      Hematocrit 44.0 %      MCV 89.6 fL      MCH 29.7 pg      MCHC 33.2 g/dL      RDW 14.4 %      RDW-SD 46.8 fl      MPV 12.2 (H) fL      Platelets 126 (L) 10*3/mm3     Scan Slide [[id number]] Collected:  08/19/18 0229    Specimen:  Blood Updated:  08/19/18 0422     Scan Slide --     Comment: See Manual Differential Results       Manual Differential [[id number]]  (Abnormal) Collected:  08/19/18 0229    Specimen:  Blood Updated:  08/19/18 0422     Neutrophil % 36.0 %      Lymphocyte % 39.0 %      Monocyte % 21.0 (H) %      Eosinophil % 3.0 %      Bands %  1.0 (L) %      Neutrophils Absolute 1.02 (L) 10*3/mm3      Lymphocytes Absolute 1.08 10*3/mm3      Monocytes Absolute 0.58 10*3/mm3      Eosinophils Absolute 0.08 10*3/mm3      Target Cells Slight/1+     Stomatocytes Slight/1+     Platelet Morphology Normal    Osmolality, Calculated [[id number]]  (Abnormal) Collected:  08/19/18 0229    Specimen:  Blood Updated:  08/19/18 0421     Osmolality Calc 269.5 (L) mOsm/kg     Comprehensive Metabolic Panel [[id number]]  (Abnormal) Collected:  08/19/18 0229    Specimen:  Blood Updated:  08/19/18 0421     Glucose 91 mg/dL      BUN 7 mg/dL      Creatinine 0.65 mg/dL      Sodium 136 mmol/L      Potassium 4.2 mmol/L      Comment: 1+ Hemolysis         Chloride 106 mmol/L      CO2 26.1 mmol/L      Calcium 8.1 mg/dL      Total Protein 6.0 g/dL      Albumin 3.10 (L) g/dL      ALT (SGPT) 4,155 (H) U/L      AST (SGOT) 3,409 (H) U/L      Alkaline Phosphatase 209 (H) U/L      Comment: Note New Reference Ranges        Total Bilirubin 5.8 (H) mg/dL      Comment: 1+ Icteric         eGFR Non African Amer 144 mL/min/1.73      Globulin 2.9 gm/dL      A/G Ratio 1.1 (L) g/dL      BUN/Creatinine Ratio 10.8     Anion Gap 3.9 mmol/L     Magnesium [[id number]]  (Normal) Collected:  08/19/18 0229    Specimen:  Blood Updated:  08/19/18 0358     Magnesium 2.5 mg/dL     Phosphorus [[id number]]  (Normal) Collected:  08/19/18  0229    Specimen:  Blood Updated:  08/19/18 0349     Phosphorus 2.7 mg/dL     aPTT [[id number]]  (Abnormal) Collected:  08/19/18 0229    Specimen:  Blood Updated:  08/19/18 0347     PTT 37.2 (H) seconds      Comment: Note new Reference Range       Narrative:       PTT Heparin Therapeutic Range:  59 - 95 seconds    Protime-INR [[id number]]  (Abnormal) Collected:  08/19/18 0229    Specimen:  Blood Updated:  08/19/18 0347     Protime 19.8 (H) Seconds      Comment: Note new Reference Range        INR 1.66 (H)    Narrative:       Suggested INR therapeutic range for stable oral anticoagulant therapy:    Low Intensity therapy:   1.5-2.0  Moderate Intensity therapy:   2.0-3.0  High Intensity therapy:   2.5-4.0          Culture Data:   No results found for: BLOODCX  No results found for: URINECX  No results found for: RESPCX  No results found for: WOUNDCX  No results found for: STOOLCX  No components found for: BODYFLD    Radiology Data:   Imaging Results (last 24 hours)     ** No results found for the last 24 hours. **          I have reviewed the patient current medications.     Assessment/Plan     Assessment and Plan:   ACUTE VIRAL HEPATITIS A   -Slight improvement in liver enzymes  -Continue supportive care  -Monitor    NONCOMPLIANCE  -Patient discontinued Suboxone abruptly after 2 year use.  -Counseled to seek his doctor's guidance before discontinuing medications    ABDOMINAL PAIN  More in the central abdominal and suprapubic regions  CT abd was largely unimpressive  Prescribe Colace  Patient also gave a history of dysuria; will obtain repeat UA    GALLBLADDER WALL THICKENING   -Apparently chronic  -Will consult general surgery    TOBACCO ABUSE  -Counseled to quit  -Continue Nicotine patch      Discharge Planning: I expect patient to be discharged to home in 1-2 days.    Leandro Tracy MD   08/19/18   3:11 PM    Electronically signed by Leandro Tracy MD at 8/19/2018  3:50 PM          Consult Notes (all)       Willie Jiménez MD at 8/19/2018  5:41 PM      Consult Orders:    1. Inpatient General Surgery Consult [[id number]] ordered by Daniele Santamaria MD at 08/19/18 1550                Inpatient General Surgery Consult  Consult performed by: WILLIE JIMÉNEZ  Consult ordered by: DANIELE SANTAMARIA  Reason for consult: Gallbladder disease  Assessment/Recommendations: Gallbladder inflammatory changes likely secondary to hepatitis A    Recommended outpatient follow-up after hepatitis has resolved          Patient Care Team:  Carmen Piedra PA-C as PCP - General (Physician Assistant)    Chief complaint:  Hepatitis A    Subjective     32-year-old male admitted with acute hepatitis A.  CT shows periportal edema and thickening of the gallbladder wall concerning for possible cholecystitis.  He has markedly elevated transaminases and bilirubin and his coags are elevated with an INR of 1.66 indicating some element of liver dysfunction secondary to his hepatitis.  No previous abdominal surgeries reported.  No intermittent abdominal pain with greasy meals or fatty meals prior to presentation with this acute episode of hepatitis A.      Abdominal Pain   Associated symptoms include dysuria. Pertinent negatives include no constipation, diarrhea, fever, headaches, nausea or vomiting.   Dysuria    Pertinent negatives include no chills, nausea or vomiting.       Review of Systems   Constitutional: Negative for activity change, appetite change, chills and fever.   HENT: Negative for sore throat and trouble swallowing.    Eyes: Negative for visual disturbance.   Respiratory: Negative for cough and shortness of breath.    Cardiovascular: Negative for chest pain and palpitations.   Gastrointestinal: Positive for abdominal pain. Negative for abdominal distention, blood in stool, constipation, diarrhea, nausea and vomiting.   Endocrine: Negative for cold intolerance and heat intolerance.   Genitourinary: Positive for dysuria.    Musculoskeletal: Negative for joint swelling.   Skin: Negative for color change, rash and wound.   Allergic/Immunologic: Negative for immunocompromised state.   Neurological: Negative for dizziness, seizures, weakness and headaches.   Hematological: Negative for adenopathy. Does not bruise/bleed easily.   Psychiatric/Behavioral: Negative for agitation and confusion.        History reviewed. No pertinent past medical history., History reviewed. No pertinent surgical history.,   Family History   Problem Relation Age of Onset   • Diabetes Father    ,   Social History   Substance Use Topics   • Smoking status: Current Every Day Smoker     Packs/day: 1.00     Types: Cigarettes   • Smokeless tobacco: Not on file   • Alcohol use No   ,   No prescriptions prior to admission.   , Scheduled Meds:    dicyclomine 20 mg Intramuscular 4x Daily   docusate sodium 100 mg Oral BID   multivitamin with minerals 1 tablet Oral Daily   nicotine 1 patch Transdermal Q24H   , Continuous Infusions:    sodium chloride 150 mL/hr Last Rate: 150 mL/hr (08/19/18 1657)   , PRN Meds:  •  aluminum-magnesium hydroxide-simethicone  •  hydrOXYzine  •  ibuprofen  •  lidocaine viscous  •  magnesium sulfate **OR** magnesium sulfate **OR** magnesium sulfate  •  sodium chloride  •  Insert peripheral IV **AND** sodium chloride and Allergies:  Patient has no known allergies.    Objective      Vital Signs  Temp:  [97.8 °F (36.6 °C)-98.8 °F (37.1 °C)] 98.8 °F (37.1 °C)  Heart Rate:  [63-73] 73  Resp:  [16-20] 16  BP: ()/(56-73) 114/73    Physical Exam   Constitutional: He is oriented to person, place, and time. He appears well-developed.   HENT:   Head: Normocephalic and atraumatic.   Mouth/Throat: Mucous membranes are normal.   Eyes: Pupils are equal, round, and reactive to light. Conjunctivae are normal. Scleral icterus is present.   Neck: Neck supple. No JVD present. No tracheal deviation present. No thyromegaly present.   Cardiovascular: Normal rate  and regular rhythm.  Exam reveals no gallop and no friction rub.    No murmur heard.  Pulmonary/Chest: Effort normal and breath sounds normal.   Abdominal: Soft. He exhibits no distension. There is no splenomegaly or hepatomegaly. There is tenderness in the right upper quadrant and epigastric area. No hernia.   Musculoskeletal: Normal range of motion. He exhibits no deformity.   Neurological: He is alert and oriented to person, place, and time.   Skin: Skin is warm and dry.   Psychiatric: He has a normal mood and affect.       Results Review:    I reviewed the patient's new clinical results.        Assessment/Plan     Active Problems:    Hepatitis A    Tobacco abuse    Viral hepatitis A without hepatic coma      Assessment:  (32-year-old male with acute hepatitis A and concern for possible concurrent gallbladder disease.).     Plan:   (Gallbladder inflammatory changes likely secondary to active hepatitis a    Recommend outpatient follow-up with ultrasound to determine if cholecystectomy is indicated after hepatitis has resolved.).       I discussed the patients findings and my recommendations with patient    Ricardo Rodriguez MD  08/19/18  5:41 PM          Electronically signed by Ricardo Rodriguez MD at 8/19/2018  5:43 PM

## 2018-08-20 NOTE — PLAN OF CARE
Problem: Patient Care Overview  Goal: Plan of Care Review  Outcome: Ongoing (interventions implemented as appropriate)   08/19/18 0311 08/20/18 0800   Coping/Psychosocial   Plan of Care Reviewed With --  patient;significant other   Plan of Care Review   Progress improving --      Goal: Discharge Needs Assessment  Outcome: Ongoing (interventions implemented as appropriate)   08/18/18 1343   Discharge Needs Assessment   Readmission Within the Last 30 Days no previous admission in last 30 days   Concerns to be Addressed no discharge needs identified   Patient/Family Anticipates Transition to home   Patient/Family Anticipated Services at Transition none   Transportation Concerns car, none   Transportation Anticipated car, drives self   Anticipated Changes Related to Illness none   Equipment Needed After Discharge none   Disability   Equipment Currently Used at Home none       Problem: Pain, Acute (Adult)  Goal: Identify Related Risk Factors and Signs and Symptoms  Outcome: Ongoing (interventions implemented as appropriate)   08/18/18 1343 08/19/18 0311   Pain, Acute (Adult)   Related Risk Factors (Acute Pain) persistent pain --    Signs and Symptoms (Acute Pain) --  verbalization of pain descriptors     Goal: Acceptable Pain Control/Comfort Level  Outcome: Ongoing (interventions implemented as appropriate)   08/20/18 0941   Pain, Acute (Adult)   Acceptable Pain Control/Comfort Level making progress toward outcome

## 2018-08-20 NOTE — PLAN OF CARE
Problem: Patient Care Overview  Goal: Plan of Care Review  Outcome: Ongoing (interventions implemented as appropriate)   08/19/18 0311 08/19/18 2000   Coping/Psychosocial   Plan of Care Reviewed With --  patient;significant other   Plan of Care Review   Progress improving --        Problem: Pain, Acute (Adult)  Goal: Identify Related Risk Factors and Signs and Symptoms  Outcome: Ongoing (interventions implemented as appropriate)    Goal: Acceptable Pain Control/Comfort Level  Outcome: Ongoing (interventions implemented as appropriate)

## 2018-08-20 NOTE — PROGRESS NOTES
[facility] HOSPITALIST PROGRESS NOTE     Patient Identification:  Name:  Chuy Sandhu  Age:  32 y.o.  Sex:  male  :  1985  MRN:  [id number]  Visit Number:  [id number]  ROOM: [address] [place] [address]     Primary Care Provider:  Carmen Piedra PA-C    Length of stay:  1    Subjective        Chief Compliant: Acute liver failure    Patient seen and examined this morning with family at the bedside.  Continue to have central abdominal pain/ pressure while getting off and turning.  Denies nausea, vomiting today.  Complains of constipation.  Has on and off episodes of dysuria but denies any hematuria.  Able to tolerate diet better today.  Ambulating.  Afebrile and no events overnight.      Objective     Current Hospital Meds:  dicyclomine 20 mg Oral 4x Daily   docusate sodium 100 mg Oral BID   lactulose 10 g Oral BID   multivitamin with minerals 1 tablet Oral Daily   nicotine 1 patch Transdermal Q24H     sodium chloride 150 mL/hr Last Rate: 150 mL/hr (18 1654)     ----------------------------------------------------------------------------------------------------------------------  Vital Signs:  Temp:  [97.7 °F (36.5 °C)-99 °F (37.2 °C)] 99 °F (37.2 °C)  Heart Rate:  [53-79] 66  Resp:  [12-18] 16  BP: ()/(50-80) 109/71  SpO2:  [96 %-99 %] 98 %  on   ;   Device (Oxygen Therapy): room air  Body mass index is 23.41 kg/m².    Wt Readings from Last 3 Encounters:   18 80.5 kg (177 lb 7 oz)   10/04/16 81.6 kg (180 lb)     Intake/Output Summary (Last 24 hours) at 18 1703  Last data filed at 18 1509   Gross per 24 hour   Intake             1360 ml   Output                0 ml   Net             1360 ml     Diet Soft Texture; Ground  ----------------------------------------------------------------------------------------------------------------------  Physical exam:  General: Comfortable, Not in distress.  Well-developed and well-nourished.   HENT:  Head:  Normocephalic and atraumatic.   Mouth:  Moist mucous membranes.    Eyes:  Conjunctivae and EOM are normal.  Pupils are equal, round, and reactive to light.  scleral icterus +ve.    Neck:  Neck supple.  No JVD present.    Cardiovascular:  Normal rate, regular rhythm with no murmur.  Pulmonary/Chest:  No respiratory distress, no wheezes, no crackles, with normal breath sounds and good air movement.  Abdomen:  Soft. Mild tender in the center. Bowel sounds are normal.  No distension.   Musculoskeletal:  No edema, no tenderness, and no deformity.  No red or swollen joints anywhere.    Neurological:  Alert and oriented to person, place, and time.  No cranial nerve deficit. No focal deficits. No facial droop.  No slurred speech.   Skin:  Skin is warm and dry. No rash noted. No pallor.   Peripheral vascular:  Strong pulses in all 4 extremities with no clubbing, no cyanosis, no edema.    ----------------------------------------------------------------------------------------------------------------------  ----------------------------------------------------------------------------------------------------------------------  Results from last 7 days  Lab Units 08/20/18 0228 08/19/18  0229 08/18/18  1207 08/18/18  0828   WBC 10*3/mm3 3.35* 2.77*  --  3.40*   HEMOGLOBIN g/dL 13.5* 14.6  --  15.3   HEMATOCRIT % 38.6* 44.0  --  43.6   MCV fL 83.2 89.6  --  83.8   MCHC g/dL 35.0 33.2  --  35.1   PLATELETS 10*3/mm3 184 126*  --  177   INR  1.63* 1.66* 1.45*  --      Results from last 7 days  Lab Units 08/20/18 0228 08/19/18 0229 08/18/18  0828   SODIUM mmol/L 139 136 135   POTASSIUM mmol/L 4.0 4.2 4.1   MAGNESIUM mg/dL  --  2.5 1.8   CHLORIDE mmol/L 108 106 102   CO2 mmol/L 27.7 26.1 27.7   BUN mg/dL 5* 7 12   CREATININE mg/dL 0.58 0.65 0.65   PHOSPHORUS mg/dL  --  2.7 3.3   EGFR IF NONAFRICN AM mL/min/1.73 >150 144 144   CALCIUM mg/dL 7.9 8.1 9.2   GLUCOSE mg/dL 92 91 89   ALBUMIN g/dL 2.90* 3.10* 3.90   BILIRUBIN mg/dL 6.9* 5.8* 5.4*   ALK PHOS U/L 192* 209*  268*   AST (SGOT) U/L 2,892* 3,409* 3,590*   ALT (SGPT) U/L 3,945* 4,155* 4,210*   Estimated Creatinine Clearance: 212 mL/min (by C-G formula based on SCr of 0.58 mg/dL).      Hemoglobin A1C   Date/Time Value Ref Range Status   08/18/2018 0828 5.30 4.50 - 5.70 % Final     Ammonia   Date Value Ref Range Status   08/18/2018 40 16 - 60 umol/L Final       Results from last 7 days  Lab Units 08/20/18  0739   NITRITE UA  Positive*   WBC UA /HPF 6-12*   BACTERIA UA /HPF 1+*   SQUAM EPITHEL UA /HPF 0-2     Blood Culture   Date Value Ref Range Status   08/19/2018 No growth at less than 24 hours  Preliminary          I have personally looked at the labs and they are summarized above.  ----------------------------------------------------------------------------------------------------------------------  Imaging Results (last 24 hours)     ** No results found for the last 24 hours. **        I have personally reviewed the radiology images and read the final radiology report.    Assessment & Plan    Assessment:  Acute hepatic failure with coagulopathy  Acute viral hepatitis A  Abdominal pain secondary to above  Gallbladder wall thickening, secondary to hepatitis or questionable cholecystitis  Constipation  Tobacco abuse    Plan:  Current the liver enzymes are trending down but very slowly.  Patient is coagulopathy but not confused.  Will continue to closely monitor liver enzymes and INR.  Also monitor closely for change in mental status.  If there is not much improvement in liver enzymes by tomorrow, I'll discuss with GI at [facility].    Continue with supportive care with IV fluids and pain management.  Discontinue Bentyl IM socks and started on by mouth Bentyl.  Given dose of Toradol.  Avoid opioids since patient has history of opioid dependence in the past and was on Suboxone, which he stopped taking himself.    Surgery consulted for gallbladder wall thickening and advised outpatient follow-up.    Started on laxative  regimen.     Nicotine patch for tobacco abuse.    Discussed the management plan in detail with the patient.  All questions answered.  The patient is high risk due to the following diagnoses/reasons:  Acute liver failure and hepatitis A    Serina Pagan MD  08/20/18  5:03 PM

## 2018-08-20 NOTE — PROGRESS NOTES
Discharge Planning Assessment   Patrick     Patient Name: Chuy Sandhu  MRN: [id number]  Today's Date: 8/20/2018    Admit Date: 8/18/2018          Discharge Needs Assessment     Row Name 08/20/18 1349       Living Environment    Lives With grandparent(s)   Pt lives at home with his grandfather.     Current Living Arrangements home/apartment/condo    Primary Care Provided by self    Quality of Family Relationships involved    Able to Return to Prior Arrangements yes       Transition Planning    Patient/Family Anticipates Transition to home    Transportation Anticipated family or friend will provide       Discharge Needs Assessment    Equipment Currently Used at Home none    Equipment Needed After Discharge none    Outpatient/Agency/Support Group Needs --   Pt does not utilize home health services.             Discharge Plan     Row Name 08/20/18 1344       Plan    Plan Pt lives at home with his grandfather and plans to return home at discharge. Pt does not utilize home health services or DME. Pt's PCP is Carmen Piedra. Pt does not have a POA or advance directive. Pt utilizes [facility] for prescriptions. Pt's significant other to provide transportation at discharge. Pt's UDS is positive for Amphetamines, THC, and Suboxone. Pt states he does not have a problem and denies the need for substance abuse resources. SS will follow and assist as needed with discharge planning.     Patient/Family in Agreement with Plan yes     Demographic Summary     Row Name 08/20/18 7071       General Information    Admission Type inpatient    Reason for Consult discharge planning;substance use concerns    Preferred Language English     Used During This Interaction no     Doris Gasca

## 2018-08-21 VITALS
TEMPERATURE: 97.7 F | HEART RATE: 63 BPM | WEIGHT: 177.44 LBS | OXYGEN SATURATION: 96 % | BODY MASS INDEX: 23.52 KG/M2 | RESPIRATION RATE: 16 BRPM | SYSTOLIC BLOOD PRESSURE: 101 MMHG | HEIGHT: 73 IN | DIASTOLIC BLOOD PRESSURE: 69 MMHG

## 2018-08-21 LAB
ALBUMIN SERPL-MCNC: 3.3 G/DL (ref 3.5–5)
ALP SERPL-CCNC: 191 U/L (ref 40–129)
ALT SERPL W P-5'-P-CCNC: 3034 U/L (ref 10–44)
APTT PPP: 34.9 SECONDS (ref 23.8–36.1)
AST SERPL-CCNC: 1823 U/L (ref 10–34)
BILIRUB CONJ SERPL-MCNC: 6 MG/DL (ref 0–0.2)
BILIRUB INDIRECT SERPL-MCNC: 2.1 MG/DL
BILIRUB SERPL-MCNC: 8.1 MG/DL (ref 0.2–1.8)
INR PPP: 1.44 (ref 0.9–1.1)
PROT SERPL-MCNC: 6.1 G/DL (ref 6–8)
PROTHROMBIN TIME: 17.8 SECONDS (ref 11–15.4)

## 2018-08-21 PROCEDURE — 85610 PROTHROMBIN TIME: CPT | Performed by: PHYSICIAN ASSISTANT

## 2018-08-21 PROCEDURE — 80076 HEPATIC FUNCTION PANEL: CPT | Performed by: HOSPITALIST

## 2018-08-21 PROCEDURE — 99239 HOSP IP/OBS DSCHRG MGMT >30: CPT | Performed by: HOSPITALIST

## 2018-08-21 PROCEDURE — 94799 UNLISTED PULMONARY SVC/PX: CPT

## 2018-08-21 PROCEDURE — 85730 THROMBOPLASTIN TIME PARTIAL: CPT | Performed by: PHYSICIAN ASSISTANT

## 2018-08-21 RX ORDER — NICOTINE 21 MG/24HR
1 PATCH, TRANSDERMAL 24 HOURS TRANSDERMAL
Qty: 15 PATCH | Refills: 0 | Status: SHIPPED | OUTPATIENT
Start: 2018-08-22

## 2018-08-21 RX ORDER — DICYCLOMINE HCL 20 MG
20 TABLET ORAL 4 TIMES DAILY PRN
Qty: 10 TABLET | Refills: 0 | Status: SHIPPED | OUTPATIENT
Start: 2018-08-21

## 2018-08-21 RX ADMIN — LIDOCAINE HYDROCHLORIDE 5 ML: 20 SOLUTION ORAL; TOPICAL at 04:45

## 2018-08-21 RX ADMIN — DOCUSATE SODIUM 100 MG: 100 CAPSULE, LIQUID FILLED ORAL at 08:09

## 2018-08-21 RX ADMIN — IBUPROFEN 600 MG: 600 TABLET ORAL at 01:05

## 2018-08-21 RX ADMIN — Medication 1 TABLET: at 08:09

## 2018-08-21 RX ADMIN — DICYCLOMINE HYDROCHLORIDE 20 MG: 20 TABLET ORAL at 08:09

## 2018-08-21 RX ADMIN — SODIUM CHLORIDE 150 ML/HR: 9 INJECTION, SOLUTION INTRAVENOUS at 06:33

## 2018-08-21 NOTE — DISCHARGE SUMMARY
[facility]    Patient Identification:  Name:  Chuy Sandhu  Age:  32 y.o.  Sex:  male  :  1985  MRN:  [id number]  Visit Number:  [id number]    Date of Admission: 2018  Date of Discharge:  2018     PCP: Carmen Piedra PA-C    DISCHARGE DIAGNOSIS  Acute hepatic failure with coagulopathy  Acute viral hepatitis A  Abdominal pain secondary to above  Gallbladder wall thickening, secondary to hepatitis   Constipation  Tobacco abuse    CONSULTS   Surgery    PROCEDURES PERFORMED  None    HOSPITAL COURSE  Mr. Sandhu is a 32 y.o. male medical history significant for tobacco abuse, drug abuse with methamphetamine and THC, admitted to [facility] with acute hepatitis A infection and acute liver failure with coagulopathy and without coma. Please see the admitting history and physical for further details.  He was managed with supportive care with IV fluids, pain management and medications for nausea vomiting. Noted to have gallbladder wall thickening on CT scan which was likely secondary to hepatitis.  Surgery consultation done and advised outpatient follow-up.Infectious workup negative.  Liver enzymes continued to trend down and patient improved symptomatically since significantly.  started ambulating and tolerating diet.  Nausea vomiting and abdominal pain completely resolved.  He was counseled regarding strict cessation from tobacco use and oral rehydration and hand hygiene.  Since patient was vitally stable, improved  Symptomatically and would like to go home, it was decided to discharge the patient to home.      Discharge disposition stable.    VITAL SIGNS:  Temp:  [97.7 °F (36.5 °C)-99 °F (37.2 °C)] 97.7 °F (36.5 °C)  Heart Rate:  [60-66] 63  Resp:  [16-18] 16  BP: (101-116)/(65-79) 101/69  SpO2:  [96 %] 96 %  on   ;   Device (Oxygen Therapy): room air    Body mass index is 23.41 kg/m².  Wt Readings from Last 3 Encounters:   18 80.5 kg (177 lb 7  oz)   10/04/16 81.6 kg (180 lb)       PHYSICAL EXAM:  General: Comfortable, Not in distress.  Well-developed and well-nourished.   HENT:  Head:  Normocephalic and atraumatic.  Mouth:  Moist mucous membranes.    Eyes:  Conjunctivae and EOM are normal.  Pupils are equal, round, and reactive to light.  scleral icterus +ve.    Neck:  Neck supple.  No JVD present.    Cardiovascular:  Normal rate, regular rhythm with no murmur.  Pulmonary/Chest:  No respiratory distress, no wheezes, no crackles, with normal breath sounds and good air movement.  Abdomen:  Soft. Non tender. Bowel sounds are normal.  No distension.   Musculoskeletal:  No edema, no tenderness, and no deformity.  No red or swollen joints anywhere.    Neurological:  Alert and oriented to person, place, and time.  No cranial nerve deficit. No focal deficits. No facial droop.  No slurred speech.   Skin:  Skin is warm and dry. No rash noted. No pallor.   Peripheral vascular:  Strong pulses in all 4 extremities with no clubbing, no cyanosis, no edema.    DISCHARGE DISPOSITION   To home    DISCHARGE MEDICATIONS:     Discharge Medications      New Medications      Instructions Start Date   dicyclomine 20 MG tablet  Commonly known as:  BENTYL   20 mg, Oral, 4 Times Daily PRN      nicotine 21 MG/24HR patch  Commonly known as:  NICODERM CQ   1 patch, Transdermal, Every 24 Hours Scheduled             Diet Instructions     Diet: Soft Texture; Thin Liquids, No Restrictions; Ground       Discharge Diet:  Soft Texture    Fluid Consistency:  Thin Liquids, No Restrictions    Soft Options:  Ground        Activity Instructions     Discharge Activity       As tolerated. Avoid any heavy lifting        No future appointments.  Your Scheduled Appointments     You have a scheduled follow-up appointment with Carmen VO On 8/22/18 at 1:45pm. Office number [phone]               Additional Instructions for the Follow-ups that You Need to Schedule     Discharge Follow-up with  PCP    As directed      Currently Documented PCP:  Carmen Piedra PA-C  PCP Phone Number:  [phone]    Follow Up Details:  On 08/24/2018.         Comprehensive Metabolic Panel     Aug 24, 2018 (Approximate)        Follow-up Information     Carmen Piedra PA-C Follow up.    Specialty:  Physician Assistant  Why:  On 08/24/2018.  Contact information:  [address]  [address]  [place] [id number]  [phone]                    TEST  RESULTS PENDING AT DISCHARGE   Order Current Status    Blood Culture - Blood, Preliminary result    Urine Culture - Urine, Preliminary result           CODE STATUS  Code Status and Medical Interventions:   Ordered at: 08/18/18 1238     Level Of Support Discussed With:    Patient     Code Status:    CPR     Medical Interventions (Level of Support Prior to Arrest):    Full       Serina Pagan MD  08/21/18  1:08 PM    Please note that this discharge summary required more than 30 minutes to complete.    Please send a copy of this dictation to the following providers:  Carmen Piedra PA-C

## 2018-08-21 NOTE — PROGRESS NOTES
Discharge Planning Assessment   Patrick     Patient Name: Chuy Sandhu  MRN: [id number]  Today's Date: 8/21/2018    Admit Date: 8/18/2018      Discharge Plan     Row Name 08/21/18 1714       Plan    Final Note Pt was discharged from home today. No other needs identified.           Katerina Pulido

## 2018-08-21 NOTE — NURSING NOTE
Pt initially wanted to leave AMA., stating he felt better and couldn't sleep here. After speaking with him and explaining that his insurance may not pay for his stay here if he left AMA, and he could always speak to the M.D. Tomorrow about being discharged. As far as not being able to sleep, I told him I would page Dr. Vázquez and request a sleep aid for him. Paged Dr. Vázquez, awaiting response.  Dr. Vázquez responded, no new orders. Administered PRN Ibuprofen.

## 2018-08-21 NOTE — PLAN OF CARE
Problem: Patient Care Overview  Goal: Plan of Care Review  Outcome: Ongoing (interventions implemented as appropriate)   08/19/18 0311 08/20/18 2000   Coping/Psychosocial   Plan of Care Reviewed With --  patient   Plan of Care Review   Progress improving --        Problem: Pain, Acute (Adult)  Goal: Identify Related Risk Factors and Signs and Symptoms  Outcome: Ongoing (interventions implemented as appropriate)   08/18/18 1343 08/19/18 0311   Pain, Acute (Adult)   Related Risk Factors (Acute Pain) persistent pain --    Signs and Symptoms (Acute Pain) --  verbalization of pain descriptors     Goal: Acceptable Pain Control/Comfort Level  Outcome: Ongoing (interventions implemented as appropriate)   08/20/18 7055   Pain, Acute (Adult)   Acceptable Pain Control/Comfort Level making progress toward outcome

## 2018-08-21 NOTE — PLAN OF CARE
Problem: Patient Care Overview  Goal: Plan of Care Review  Outcome: Ongoing (interventions implemented as appropriate)   08/19/18 0311 08/21/18 0800   Coping/Psychosocial   Plan of Care Reviewed With --  patient   Plan of Care Review   Progress improving --      Goal: Discharge Needs Assessment  Outcome: Ongoing (interventions implemented as appropriate)   08/18/18 1343 08/20/18 1342   Discharge Needs Assessment   Readmission Within the Last 30 Days no previous admission in last 30 days --    Concerns to be Addressed no discharge needs identified --    Patient/Family Anticipates Transition to --  home   Patient/Family Anticipated Services at Transition none --    Transportation Concerns car, none --    Transportation Anticipated --  family or friend will provide   Anticipated Changes Related to Illness none --    Equipment Needed After Discharge --  none   Outpatient/Agency/Support Group Needs --  (Pt does not utilize home health services. )   Disability   Equipment Currently Used at Home --  none       Problem: Pain, Acute (Adult)  Goal: Identify Related Risk Factors and Signs and Symptoms  Outcome: Ongoing (interventions implemented as appropriate)   08/18/18 1343 08/19/18 0311   Pain, Acute (Adult)   Related Risk Factors (Acute Pain) persistent pain --    Signs and Symptoms (Acute Pain) --  verbalization of pain descriptors     Goal: Acceptable Pain Control/Comfort Level  Outcome: Ongoing (interventions implemented as appropriate)   08/21/18 1003   Pain, Acute (Adult)   Acceptable Pain Control/Comfort Level making progress toward outcome

## 2018-08-21 NOTE — DISCHARGE INSTR - APPOINTMENTS
You have a scheduled follow-up appointment with Carmen SPRAGUE. On 8/22/18 at 1:45pm. Office number [phone]

## 2018-08-21 NOTE — PAYOR COMM NOTE
"[facility]  NPI:[id number]    Utilization Review  Contact: Maura Hugo RN  Phone: [phone]  Fax:[phone]    DISCHARGE NOTIFICATION  DISCHARGE TO HOME ON 8/21/2018  Auth # [id number]    Gregoria Sandhu  (32 y.o. Male)     Date of Birth Social Security Number Address Home Phone MRN    1985  [address] [facility] [id number] [phone] [id number]    Denominational Marital Status          [facility] Single       Admission Date Admission Type Admitting Provider Attending Provider Department, Room/Bed    8/18/18 Emergency Leandro Tracy MD  [place] [id number]/    Discharge Date Discharge Disposition Discharge Destination        8/21/2018 Home or Self Care              Attending Provider:  (none)   Allergies:  No Known Allergies    Isolation:  None   Infection:  None   Code Status:  CPR    Ht:  185.4 cm (73\")   Wt:  80.5 kg (177 lb 7 oz)    Admission Cmt:  None   Principal Problem:  None                Active Insurance as of 8/18/2018     Primary Coverage     Payor Plan Insurance Group Employer/Plan Group    [employer] MED[id number]     Payor Plan Address Payor Plan Phone Number Effective From Effective To    PO  [phone] 10/1/2016     [facility] [id number]       Subscriber Name Subscriber Birth Date Member ID       GREGORIA SANDHU 1985 [id number]                 Emergency Contacts      (Rel.) Home Phone Work Phone Mobile Phone    Sonu Payan (Grandparent) [phone] -- --            Discharge Summary     No notes of this type exist for this encounter.        "

## 2018-08-22 LAB — BACTERIA SPEC AEROBE CULT: NORMAL

## 2018-08-24 LAB — BACTERIA SPEC AEROBE CULT: NORMAL
